# Patient Record
Sex: MALE | Race: WHITE | NOT HISPANIC OR LATINO | Employment: UNEMPLOYED | URBAN - NONMETROPOLITAN AREA
[De-identification: names, ages, dates, MRNs, and addresses within clinical notes are randomized per-mention and may not be internally consistent; named-entity substitution may affect disease eponyms.]

---

## 2024-01-10 ENCOUNTER — HOSPITAL ENCOUNTER (INPATIENT)
Facility: HOSPITAL | Age: 54
LOS: 2 days | Discharge: HOME | DRG: 897 | End: 2024-01-12
Attending: PSYCHIATRY & NEUROLOGY | Admitting: PSYCHIATRY & NEUROLOGY
Payer: COMMERCIAL

## 2024-01-10 ENCOUNTER — HOSPITAL ENCOUNTER (EMERGENCY)
Facility: HOSPITAL | Age: 54
Discharge: PSYCHIATRIC OR SUBSTANCE USE FACILITY | DRG: 897 | End: 2024-01-10
Attending: STUDENT IN AN ORGANIZED HEALTH CARE EDUCATION/TRAINING PROGRAM | Admitting: STUDENT IN AN ORGANIZED HEALTH CARE EDUCATION/TRAINING PROGRAM
Payer: COMMERCIAL

## 2024-01-10 VITALS
HEART RATE: 54 BPM | DIASTOLIC BLOOD PRESSURE: 72 MMHG | BODY MASS INDEX: 25.77 KG/M2 | SYSTOLIC BLOOD PRESSURE: 129 MMHG | TEMPERATURE: 98.2 F | WEIGHT: 180 LBS | RESPIRATION RATE: 17 BRPM | OXYGEN SATURATION: 100 % | HEIGHT: 70 IN

## 2024-01-10 DIAGNOSIS — F15.10 METHAMPHETAMINE ABUSE: Primary | ICD-10-CM

## 2024-01-10 DIAGNOSIS — F11.20 BUPRENORPHINE DEPENDENCE: ICD-10-CM

## 2024-01-10 PROBLEM — F19.20 CHEMICAL DEPENDENCY: Status: ACTIVE | Noted: 2024-01-10

## 2024-01-10 LAB
ALBUMIN SERPL-MCNC: 3.8 G/DL (ref 3.5–5.2)
ALBUMIN/GLOB SERPL: 0.8 G/DL
ALP SERPL-CCNC: 122 U/L (ref 39–117)
ALT SERPL W P-5'-P-CCNC: 117 U/L (ref 1–41)
AMPHET+METHAMPHET UR QL: POSITIVE
AMPHETAMINES UR QL: POSITIVE
ANION GAP SERPL CALCULATED.3IONS-SCNC: 7.7 MMOL/L (ref 5–15)
AST SERPL-CCNC: 87 U/L (ref 1–40)
BARBITURATES UR QL SCN: NEGATIVE
BASOPHILS # BLD AUTO: 0.04 10*3/MM3 (ref 0–0.2)
BASOPHILS NFR BLD AUTO: 0.4 % (ref 0–1.5)
BENZODIAZ UR QL SCN: NEGATIVE
BILIRUB SERPL-MCNC: 0.6 MG/DL (ref 0–1.2)
BILIRUB UR QL STRIP: ABNORMAL
BUN SERPL-MCNC: 17 MG/DL (ref 6–20)
BUN/CREAT SERPL: 19.3 (ref 7–25)
BUPRENORPHINE SERPL-MCNC: POSITIVE NG/ML
CALCIUM SPEC-SCNC: 9.2 MG/DL (ref 8.6–10.5)
CANNABINOIDS SERPL QL: NEGATIVE
CHLORIDE SERPL-SCNC: 99 MMOL/L (ref 98–107)
CLARITY UR: CLEAR
CO2 SERPL-SCNC: 24.3 MMOL/L (ref 22–29)
COCAINE UR QL: NEGATIVE
COLOR UR: ABNORMAL
CREAT SERPL-MCNC: 0.88 MG/DL (ref 0.76–1.27)
DEPRECATED RDW RBC AUTO: 41.6 FL (ref 37–54)
EGFRCR SERPLBLD CKD-EPI 2021: 102.2 ML/MIN/1.73
EOSINOPHIL # BLD AUTO: 0.1 10*3/MM3 (ref 0–0.4)
EOSINOPHIL NFR BLD AUTO: 0.9 % (ref 0.3–6.2)
ERYTHROCYTE [DISTWIDTH] IN BLOOD BY AUTOMATED COUNT: 12.7 % (ref 12.3–15.4)
ETHANOL BLD-MCNC: <10 MG/DL (ref 0–10)
ETHANOL UR QL: <0.01 %
FENTANYL UR-MCNC: POSITIVE NG/ML
GLOBULIN UR ELPH-MCNC: 4.5 GM/DL
GLUCOSE SERPL-MCNC: 139 MG/DL (ref 65–99)
GLUCOSE UR STRIP-MCNC: NEGATIVE MG/DL
HCT VFR BLD AUTO: 43.3 % (ref 37.5–51)
HGB BLD-MCNC: 14.6 G/DL (ref 13–17.7)
HGB UR QL STRIP.AUTO: NEGATIVE
IMM GRANULOCYTES # BLD AUTO: 0.04 10*3/MM3 (ref 0–0.05)
IMM GRANULOCYTES NFR BLD AUTO: 0.4 % (ref 0–0.5)
KETONES UR QL STRIP: ABNORMAL
LEUKOCYTE ESTERASE UR QL STRIP.AUTO: NEGATIVE
LYMPHOCYTES # BLD AUTO: 1.25 10*3/MM3 (ref 0.7–3.1)
LYMPHOCYTES NFR BLD AUTO: 11.2 % (ref 19.6–45.3)
MAGNESIUM SERPL-MCNC: 2.2 MG/DL (ref 1.6–2.6)
MCH RBC QN AUTO: 29.7 PG (ref 26.6–33)
MCHC RBC AUTO-ENTMCNC: 33.7 G/DL (ref 31.5–35.7)
MCV RBC AUTO: 88.2 FL (ref 79–97)
METHADONE UR QL SCN: NEGATIVE
MONOCYTES # BLD AUTO: 0.38 10*3/MM3 (ref 0.1–0.9)
MONOCYTES NFR BLD AUTO: 3.4 % (ref 5–12)
NEUTROPHILS NFR BLD AUTO: 83.7 % (ref 42.7–76)
NEUTROPHILS NFR BLD AUTO: 9.39 10*3/MM3 (ref 1.7–7)
NITRITE UR QL STRIP: NEGATIVE
NRBC BLD AUTO-RTO: 0 /100 WBC (ref 0–0.2)
OPIATES UR QL: NEGATIVE
OXYCODONE UR QL SCN: NEGATIVE
PCP UR QL SCN: NEGATIVE
PH UR STRIP.AUTO: 6 [PH] (ref 5–8)
PLATELET # BLD AUTO: 300 10*3/MM3 (ref 140–450)
PMV BLD AUTO: 10 FL (ref 6–12)
POTASSIUM SERPL-SCNC: 4.1 MMOL/L (ref 3.5–5.2)
PROT SERPL-MCNC: 8.3 G/DL (ref 6–8.5)
PROT UR QL STRIP: NEGATIVE
RBC # BLD AUTO: 4.91 10*6/MM3 (ref 4.14–5.8)
SODIUM SERPL-SCNC: 131 MMOL/L (ref 136–145)
SP GR UR STRIP: >1.03 (ref 1–1.03)
TRICYCLICS UR QL SCN: NEGATIVE
UROBILINOGEN UR QL STRIP: ABNORMAL
WBC NRBC COR # BLD AUTO: 11.2 10*3/MM3 (ref 3.4–10.8)

## 2024-01-10 PROCEDURE — 80053 COMPREHEN METABOLIC PANEL: CPT | Performed by: STUDENT IN AN ORGANIZED HEALTH CARE EDUCATION/TRAINING PROGRAM

## 2024-01-10 PROCEDURE — 83735 ASSAY OF MAGNESIUM: CPT | Performed by: STUDENT IN AN ORGANIZED HEALTH CARE EDUCATION/TRAINING PROGRAM

## 2024-01-10 PROCEDURE — 93010 ELECTROCARDIOGRAM REPORT: CPT | Performed by: INTERNAL MEDICINE

## 2024-01-10 PROCEDURE — 81003 URINALYSIS AUTO W/O SCOPE: CPT | Performed by: STUDENT IN AN ORGANIZED HEALTH CARE EDUCATION/TRAINING PROGRAM

## 2024-01-10 PROCEDURE — 80307 DRUG TEST PRSMV CHEM ANLYZR: CPT | Performed by: STUDENT IN AN ORGANIZED HEALTH CARE EDUCATION/TRAINING PROGRAM

## 2024-01-10 PROCEDURE — 93005 ELECTROCARDIOGRAM TRACING: CPT | Performed by: PSYCHIATRY & NEUROLOGY

## 2024-01-10 PROCEDURE — 99285 EMERGENCY DEPT VISIT HI MDM: CPT

## 2024-01-10 PROCEDURE — 36415 COLL VENOUS BLD VENIPUNCTURE: CPT

## 2024-01-10 PROCEDURE — 85025 COMPLETE CBC W/AUTO DIFF WBC: CPT | Performed by: STUDENT IN AN ORGANIZED HEALTH CARE EDUCATION/TRAINING PROGRAM

## 2024-01-10 PROCEDURE — 82077 ASSAY SPEC XCP UR&BREATH IA: CPT | Performed by: STUDENT IN AN ORGANIZED HEALTH CARE EDUCATION/TRAINING PROGRAM

## 2024-01-10 RX ORDER — ECHINACEA PURPUREA EXTRACT 125 MG
2 TABLET ORAL AS NEEDED
Status: DISCONTINUED | OUTPATIENT
Start: 2024-01-10 | End: 2024-01-12 | Stop reason: HOSPADM

## 2024-01-10 RX ORDER — CLONIDINE HYDROCHLORIDE 0.1 MG/1
0.1 TABLET ORAL ONCE AS NEEDED
Status: DISCONTINUED | OUTPATIENT
Start: 2024-01-14 | End: 2024-01-12 | Stop reason: HOSPADM

## 2024-01-10 RX ORDER — CYCLOBENZAPRINE HCL 10 MG
10 TABLET ORAL 3 TIMES DAILY PRN
Status: DISCONTINUED | OUTPATIENT
Start: 2024-01-10 | End: 2024-01-12 | Stop reason: HOSPADM

## 2024-01-10 RX ORDER — LORAZEPAM 1 MG/1
1 TABLET ORAL
Status: DISCONTINUED | OUTPATIENT
Start: 2024-01-13 | End: 2024-01-12 | Stop reason: HOSPADM

## 2024-01-10 RX ORDER — TRAZODONE HYDROCHLORIDE 50 MG/1
50 TABLET ORAL NIGHTLY PRN
Status: DISCONTINUED | OUTPATIENT
Start: 2024-01-10 | End: 2024-01-12 | Stop reason: HOSPADM

## 2024-01-10 RX ORDER — HYDROXYZINE 50 MG/1
50 TABLET, FILM COATED ORAL EVERY 6 HOURS PRN
Status: DISCONTINUED | OUTPATIENT
Start: 2024-01-10 | End: 2024-01-12 | Stop reason: HOSPADM

## 2024-01-10 RX ORDER — LORAZEPAM 0.5 MG/1
0.5 TABLET ORAL EVERY 4 HOURS PRN
Status: DISCONTINUED | OUTPATIENT
Start: 2024-01-14 | End: 2024-01-12 | Stop reason: HOSPADM

## 2024-01-10 RX ORDER — BENZTROPINE MESYLATE 1 MG/ML
1 INJECTION INTRAMUSCULAR; INTRAVENOUS ONCE AS NEEDED
Status: DISCONTINUED | OUTPATIENT
Start: 2024-01-10 | End: 2024-01-12 | Stop reason: HOSPADM

## 2024-01-10 RX ORDER — LORAZEPAM 2 MG/1
2 TABLET ORAL
Status: COMPLETED | OUTPATIENT
Start: 2024-01-11 | End: 2024-01-11

## 2024-01-10 RX ORDER — LORAZEPAM 2 MG/1
2 TABLET ORAL EVERY 4 HOURS PRN
Status: ACTIVE | OUTPATIENT
Start: 2024-01-11 | End: 2024-01-12

## 2024-01-10 RX ORDER — ALUMINA, MAGNESIA, AND SIMETHICONE 2400; 2400; 240 MG/30ML; MG/30ML; MG/30ML
15 SUSPENSION ORAL EVERY 6 HOURS PRN
Status: DISCONTINUED | OUTPATIENT
Start: 2024-01-10 | End: 2024-01-12 | Stop reason: HOSPADM

## 2024-01-10 RX ORDER — DICYCLOMINE HYDROCHLORIDE 10 MG/1
10 CAPSULE ORAL 3 TIMES DAILY PRN
Status: DISCONTINUED | OUTPATIENT
Start: 2024-01-10 | End: 2024-01-12 | Stop reason: HOSPADM

## 2024-01-10 RX ORDER — CLONIDINE HYDROCHLORIDE 0.1 MG/1
0.1 TABLET ORAL 2 TIMES DAILY PRN
Status: DISCONTINUED | OUTPATIENT
Start: 2024-01-13 | End: 2024-01-12 | Stop reason: HOSPADM

## 2024-01-10 RX ORDER — BENZONATATE 100 MG/1
100 CAPSULE ORAL 3 TIMES DAILY PRN
Status: DISCONTINUED | OUTPATIENT
Start: 2024-01-10 | End: 2024-01-12 | Stop reason: HOSPADM

## 2024-01-10 RX ORDER — BENZTROPINE MESYLATE 1 MG/1
2 TABLET ORAL ONCE AS NEEDED
Status: DISCONTINUED | OUTPATIENT
Start: 2024-01-10 | End: 2024-01-12 | Stop reason: HOSPADM

## 2024-01-10 RX ORDER — CLONIDINE HYDROCHLORIDE 0.1 MG/1
0.1 TABLET ORAL 4 TIMES DAILY PRN
Status: ACTIVE | OUTPATIENT
Start: 2024-01-10 | End: 2024-01-11

## 2024-01-10 RX ORDER — LOPERAMIDE HYDROCHLORIDE 2 MG/1
2 CAPSULE ORAL
Status: DISCONTINUED | OUTPATIENT
Start: 2024-01-10 | End: 2024-01-12 | Stop reason: HOSPADM

## 2024-01-10 RX ORDER — HYDRALAZINE HYDROCHLORIDE 25 MG/1
25 TABLET, FILM COATED ORAL DAILY PRN
Status: DISCONTINUED | OUTPATIENT
Start: 2024-01-10 | End: 2024-01-12 | Stop reason: HOSPADM

## 2024-01-10 RX ORDER — LORAZEPAM 0.5 MG/1
0.5 TABLET ORAL
Status: DISCONTINUED | OUTPATIENT
Start: 2024-01-14 | End: 2024-01-12 | Stop reason: HOSPADM

## 2024-01-10 RX ORDER — ONDANSETRON 4 MG/1
4 TABLET, ORALLY DISINTEGRATING ORAL EVERY 6 HOURS PRN
Status: DISCONTINUED | OUTPATIENT
Start: 2024-01-10 | End: 2024-01-12 | Stop reason: HOSPADM

## 2024-01-10 RX ORDER — CLONIDINE HYDROCHLORIDE 0.1 MG/1
0.1 TABLET ORAL 4 TIMES DAILY PRN
Status: ACTIVE | OUTPATIENT
Start: 2024-01-11 | End: 2024-01-12

## 2024-01-10 RX ORDER — FAMOTIDINE 20 MG/1
20 TABLET, FILM COATED ORAL 2 TIMES DAILY PRN
Status: DISCONTINUED | OUTPATIENT
Start: 2024-01-10 | End: 2024-01-12 | Stop reason: HOSPADM

## 2024-01-10 RX ORDER — IBUPROFEN 400 MG/1
400 TABLET ORAL EVERY 6 HOURS PRN
Status: DISCONTINUED | OUTPATIENT
Start: 2024-01-10 | End: 2024-01-12 | Stop reason: HOSPADM

## 2024-01-10 RX ORDER — LORAZEPAM 1 MG/1
1 TABLET ORAL EVERY 4 HOURS PRN
Status: DISCONTINUED | OUTPATIENT
Start: 2024-01-13 | End: 2024-01-12 | Stop reason: HOSPADM

## 2024-01-10 RX ORDER — CLONIDINE HYDROCHLORIDE 0.1 MG/1
0.1 TABLET ORAL 3 TIMES DAILY PRN
Status: DISCONTINUED | OUTPATIENT
Start: 2024-01-12 | End: 2024-01-12 | Stop reason: HOSPADM

## 2024-01-10 RX ORDER — LORAZEPAM 2 MG/1
2 TABLET ORAL
Status: DISPENSED | OUTPATIENT
Start: 2024-01-10 | End: 2024-01-11

## 2024-01-10 RX ADMIN — HYDROXYZINE HYDROCHLORIDE 50 MG: 50 TABLET ORAL at 21:14

## 2024-01-10 RX ADMIN — IBUPROFEN 400 MG: 400 TABLET, FILM COATED ORAL at 21:15

## 2024-01-10 RX ADMIN — CYCLOBENZAPRINE 10 MG: 10 TABLET, FILM COATED ORAL at 21:14

## 2024-01-10 RX ADMIN — TRAZODONE HYDROCHLORIDE 50 MG: 50 TABLET ORAL at 21:15

## 2024-01-10 RX ADMIN — LORAZEPAM 2 MG: 2 TABLET ORAL at 22:08

## 2024-01-10 RX ADMIN — DICYCLOMINE HYDROCHLORIDE 10 MG: 10 CAPSULE ORAL at 21:15

## 2024-01-10 NOTE — NURSING NOTE
Spoke to  discussed pt assessment, labs, and vitals. New orders to admit pt to detox on routine orders with Ativan protocol and Clonidine protocol.  RBVOx2   ED provider made aware.

## 2024-01-10 NOTE — PLAN OF CARE
"Goal Outcome Evaluation:  Plan of Care Reviewed With: patient  Patient Agreement with Plan of Care: agrees         Outcome Evaluation  Pt presented to detox unit stating, \"I just want to get better.\" PT is a 10 year user of fentanyl 1g-2g IV daily. Pt states he drinks a couple pints of whiskey a day \"or whatever i can get my hands on\" Pt uses meth, unknown amount and frequency. Pt last use of substances was 2 days ago. pt rated anxiety 10, depression 10, and craving 10. pt rated pain 10. Pt denies SI, HI, AVH. Pt states he is irritable. COWS 15. CIWA 15. pt reports fair appetite and poor sleep.                               "

## 2024-01-10 NOTE — NURSING NOTE
"Pt brought in by [facility], pt states he is here to detox off fentanyl and alcohol.   Last use was 2 days ago, 1 gram of fentanyl and couple pints of whiskey.   Pt states he uses fentanyl (IV or snort) everyday around 1-2 grams, and drinks a couple pints of whiskey everyday. States he's been doing this for at least 10 years.   Pt states he occasionally smokes meth, and \"I dont know what else, just anything I can get ahold of.\" Unknown when he last took anything else.     COWS-15  CIWA-14  Pt rates anxiety and depression 10.  Pt adamantly denies SI/HI/AVH.    "

## 2024-01-10 NOTE — ED PROVIDER NOTES
Subjective   History of Present Illness  53-year-old male who presents to the emergency department with complaint of drug abuse.  Patient states has had alcohol, fentanyl, heroin abuse.  Patient denies any suicidal or homicidal ideation.    History provided by:  Patient   used: No    Addiction Problem  Location:  Alcohol, fentanyl, heroin  Severity:  Moderate  Onset quality:  Gradual  Duration:  2 days  Timing:  Intermittent  Progression:  Worsening  Chronicity:  New  Associated symptoms: no chest pain, no congestion, no cough, no diarrhea, no fever, no headaches, no loss of consciousness, no nausea, no rhinorrhea, no shortness of breath and no sore throat        Review of Systems   Constitutional: Negative.  Negative for chills and fever.   HENT:  Negative for congestion, rhinorrhea and sore throat.    Eyes: Negative.  Negative for photophobia, pain and itching.   Respiratory: Negative.  Negative for cough and shortness of breath.    Cardiovascular:  Negative for chest pain.   Gastrointestinal: Negative.  Negative for diarrhea and nausea.   Endocrine: Negative.  Negative for heat intolerance, polydipsia and polyphagia.   Genitourinary:  Negative for genital sores, hematuria and penile pain.   Musculoskeletal: Negative.  Negative for arthralgias, gait problem and joint swelling.   Skin: Negative.    Neurological:  Negative for loss of consciousness and headaches.   Hematological: Negative.    Psychiatric/Behavioral: Negative.  Negative for confusion, dysphoric mood and hallucinations.    All other systems reviewed and are negative.      Past Medical History:   Diagnosis Date    Asthma     Hepatitis C        No Known Allergies    History reviewed. No pertinent surgical history.    History reviewed. No pertinent family history.    Social History     Socioeconomic History    Marital status: Single   Tobacco Use    Smoking status: Former     Types: Cigarettes     Quit date: 2020     Years since  quittin.0   Vaping Use    Vaping Use: Every day    Substances: Nicotine, Flavoring    Devices: Disposable   Substance and Sexual Activity    Alcohol use: Yes     Alcohol/week: 10.0 standard drinks of alcohol     Types: 10 Drinks containing 0.5 oz of alcohol per week     Comment: Drinks 2 pints of whiskey everyday    Drug use: Yes     Types: Fentanyl, Methamphetamines     Comment: 1 gram IV or snort everyday, doesn't know how much meth    Sexual activity: Not Currently           Objective   Physical Exam  Vitals and nursing note reviewed.   Constitutional:       General: He is not in acute distress.     Appearance: Normal appearance. He is normal weight. He is not ill-appearing, toxic-appearing or diaphoretic.   HENT:      Head: Normocephalic and atraumatic.      Right Ear: Tympanic membrane, ear canal and external ear normal. There is no impacted cerumen.      Left Ear: Tympanic membrane, ear canal and external ear normal. There is no impacted cerumen.      Nose: Nose normal. No congestion or rhinorrhea.      Mouth/Throat:      Mouth: Mucous membranes are moist.      Pharynx: Oropharynx is clear. No oropharyngeal exudate or posterior oropharyngeal erythema.   Eyes:      General: No scleral icterus.        Right eye: No discharge.         Left eye: No discharge.      Extraocular Movements: Extraocular movements intact.      Conjunctiva/sclera: Conjunctivae normal.      Pupils: Pupils are equal, round, and reactive to light.   Cardiovascular:      Rate and Rhythm: Normal rate and regular rhythm.      Pulses: Normal pulses.      Heart sounds: Normal heart sounds. No murmur heard.     No friction rub. No gallop.   Pulmonary:      Effort: Pulmonary effort is normal. No respiratory distress.      Breath sounds: Normal breath sounds. No stridor. No wheezing, rhonchi or rales.   Abdominal:      General: Abdomen is flat. Bowel sounds are normal. There is no distension.      Palpations: Abdomen is soft. There is no mass.       Tenderness: There is no abdominal tenderness. There is no right CVA tenderness, left CVA tenderness, guarding or rebound.      Hernia: No hernia is present.   Musculoskeletal:         General: No swelling, tenderness, deformity or signs of injury. Normal range of motion.      Cervical back: Normal range of motion and neck supple. No rigidity or tenderness.      Right lower leg: No edema.   Lymphadenopathy:      Cervical: No cervical adenopathy.   Skin:     General: Skin is warm and dry.      Capillary Refill: Capillary refill takes less than 2 seconds.      Coloration: Skin is not jaundiced or pale.      Findings: No bruising, erythema or lesion.   Neurological:      General: No focal deficit present.      Mental Status: He is alert and oriented to person, place, and time. Mental status is at baseline.      Cranial Nerves: No cranial nerve deficit.      Sensory: No sensory deficit.      Motor: No weakness.      Coordination: Coordination normal.      Gait: Gait normal.   Psychiatric:         Mood and Affect: Mood normal.         Behavior: Behavior normal.         Thought Content: Thought content normal.         Judgment: Judgment normal.         Procedures           ED Course                                             Medical Decision Making  Problems Addressed:  Buprenorphine dependence: complicated acute illness or injury  Methamphetamine abuse: complicated acute illness or injury    Amount and/or Complexity of Data Reviewed  Labs: ordered.        Final diagnoses:   Methamphetamine abuse   Buprenorphine dependence       ED Disposition  ED Disposition       ED Disposition   DC/Transfer to Behavioral Health Condition   Stable    Comment   --               No follow-up provider specified.       Medication List      No changes were made to your prescriptions during this visit.            Leandro Henao PA-C  01/10/24 9968

## 2024-01-11 PROBLEM — F10.20 ALCOHOL USE DISORDER, SEVERE, DEPENDENCE: Status: ACTIVE | Noted: 2024-01-10

## 2024-01-11 PROBLEM — F15.10 METHAMPHETAMINE USE DISORDER, MILD: Status: ACTIVE | Noted: 2024-01-11

## 2024-01-11 PROBLEM — F11.20 OPIOID USE DISORDER, SEVERE, DEPENDENCE: Status: ACTIVE | Noted: 2024-01-11

## 2024-01-11 PROBLEM — F17.200 NICOTINE USE DISORDER: Status: ACTIVE | Noted: 2024-01-11

## 2024-01-11 LAB
HAV IGM SERPL QL IA: ABNORMAL
HBV CORE IGM SERPL QL IA: ABNORMAL
HBV SURFACE AG SERPL QL IA: ABNORMAL
HCV AB SER DONR QL: REACTIVE

## 2024-01-11 PROCEDURE — 80074 ACUTE HEPATITIS PANEL: CPT | Performed by: PSYCHIATRY & NEUROLOGY

## 2024-01-11 PROCEDURE — 99223 1ST HOSP IP/OBS HIGH 75: CPT | Performed by: PSYCHIATRY & NEUROLOGY

## 2024-01-11 RX ADMIN — CYCLOBENZAPRINE 10 MG: 10 TABLET, FILM COATED ORAL at 08:29

## 2024-01-11 RX ADMIN — LORAZEPAM 2 MG: 2 TABLET ORAL at 08:45

## 2024-01-11 RX ADMIN — TRAZODONE HYDROCHLORIDE 50 MG: 50 TABLET ORAL at 21:28

## 2024-01-11 RX ADMIN — LORAZEPAM 2 MG: 2 TABLET ORAL at 14:46

## 2024-01-11 RX ADMIN — HYDROXYZINE HYDROCHLORIDE 50 MG: 50 TABLET ORAL at 21:28

## 2024-01-11 RX ADMIN — LORAZEPAM 2 MG: 2 TABLET ORAL at 21:28

## 2024-01-11 RX ADMIN — DICYCLOMINE HYDROCHLORIDE 10 MG: 10 CAPSULE ORAL at 08:29

## 2024-01-11 RX ADMIN — IBUPROFEN 400 MG: 400 TABLET, FILM COATED ORAL at 08:29

## 2024-01-11 NOTE — H&P
INITIAL PSYCHIATRIC HISTORY & PHYSICAL    Patient Identification:  Name:  Jono Jimenez  Age:  53 y.o.  Sex:  male  :  1970  MRN:  [id number]   Visit Number:  [id number]  Primary Care Physician:  Provider, No Known    SUBJECTIVE    CC/Focus of Exam: Fentanyl and alcohol use    HPI: Jono Jimenez is a 53 y.o. male who was admitted on 1/10/2024 with complaints of Fentanyl and alcohol use use and withdrawals. The patient reports a long history of substance use. First use was 17 years old. Over time the use increased and the patient  continued to use despite negative consequences including relationship problems, social and financial problems. The patient endorses symptoms of tolerance and withdrawals and ongoing cravings to use. Has tried to cut down and stop but has not been successful. Spends too much time and resources in pursuit of substance use. Longest period of sobriety is reported to be 6 months.  Currently using fentanyl (IV or snort) everyday around 1-2 grams, and drinks a couple pints of whiskey everyday.   Last use 3 days ago  Withdrawal symptoms body aches, stomach cramps, hurting all over.     PAST PSYCHIATRIC HX: Patient reports he was treated for depression and anxiety in the past.     SUBSTANCE USE HX: See HPI.  Patient also admits to using meth every once in a while.    SOCIAL HX:   Social History     Socioeconomic History    Marital status: Single   Tobacco Use    Smoking status: Former     Types: Cigarettes     Quit date:      Years since quittin.0   Vaping Use    Vaping Use: Every day    Substances: Nicotine, Flavoring    Devices: Disposable   Substance and Sexual Activity    Alcohol use: Yes     Alcohol/week: 10.0 standard drinks of alcohol     Types: 10 Drinks containing 0.5 oz of alcohol per week     Comment: Drinks 2 pints of whiskey everyday    Drug use: Yes     Types: Fentanyl, Methamphetamines     Comment: 1 gram IV or snort everyday, doesn't know how much meth     Sexual activity: Not Currently         Past Medical History:   Diagnosis Date    Asthma     Hepatitis C         History reviewed. No pertinent surgical history.    History reviewed. No pertinent family history.      No medications prior to admission.         ALLERGIES:  Patient has no known allergies.    Temp:  [97 °F (36.1 °C)-98.2 °F (36.8 °C)] 97.1 °F (36.2 °C)  Heart Rate:  [54-69] 58  Resp:  [16-18] 16  BP: (120-129)/(62-76) 129/70    REVIEW OF SYSTEMS:  Review of Systems   Constitutional:  Positive for chills, diaphoresis and fatigue.   HENT: Negative.     Eyes: Negative.    Respiratory: Negative.     Cardiovascular: Negative.    Gastrointestinal:  Positive for abdominal pain.   Endocrine: Negative.    Genitourinary: Negative.    Musculoskeletal:  Positive for arthralgias, back pain and myalgias.   Skin: Negative.    Allergic/Immunologic: Negative.    Neurological:  Positive for tremors and weakness.   Hematological: Negative.    Psychiatric/Behavioral:  Positive for dysphoric mood. The patient is nervous/anxious.         OBJECTIVE    PHYSICAL EXAM:  Physical Exam  Constitutional:  Appears well-developed and well-nourished.   HENT:   Head: Normocephalic and atraumatic.   Right Ear: External ear normal.   Left Ear: External ear normal.   Mouth/Throat: Oropharynx is clear and moist.   Eyes: Pupils are equal, round, and reactive to light. Conjunctivae and EOM are normal.   Neck: Normal range of motion. Neck supple.   Cardiovascular: Normal rate, regular rhythm and normal heart sounds.    Respiratory: Effort normal and breath sounds normal. No respiratory distress. No wheezes.   GI: Soft. Bowel sounds are normal.No distension. There is no tenderness.   Musculoskeletal: Normal range of motion. No edema or deformity.   Neurological:No cranial nerve deficit. Coordination normal.   Skin: Skin is warm and dry. No rash noted. No erythema.     MENTAL STATUS EXAM:   Hygiene:   fair  Cooperation:  Cooperative  Eye Contact:   Fair  Psychomotor Behavior:  Appropriate  Affect:  Appropriate  Hopelessness: Denies  Speech:  Normal  Thought Process: Goal directed  Thought Content:  Normal  Suicidal:  None  Homicidal:  None  Hallucinations:  None  Delusion:  None  Memory:  Intact  Orientation:  Person, Place, Time and Situation  Reliability:  fair  Insight:  Fair  Judgement:  Fair  Impulse Control:  Fair    Imaging Results (Last 24 Hours)       ** No results found for the last 24 hours. **             ECG/EMG Results (most recent)       Procedure Component Value Units Date/Time    ECG 12 Lead Other; Baseline Cardiac Status [[id number]] Collected: 01/10/24 1757     Updated: 01/10/24 1758     QT Interval 436 ms      QTC Interval 438 ms     Narrative:      Test Reason : Other~  Blood Pressure :   */*   mmHG  Vent. Rate :  61 BPM     Atrial Rate :  61 BPM     P-R Int : 122 ms          QRS Dur : 100 ms      QT Int : 436 ms       P-R-T Axes :  72  76  63 degrees     QTc Int : 438 ms    Normal sinus rhythm  Voltage criteria for left ventricular hypertrophy  Nonspecific ST abnormality  Abnormal ECG  No previous ECGs available    Referred By: REINA           Confirmed By:              Lab Results   Component Value Date    GLUCOSE 139 (H) 01/10/2024    BUN 17 01/10/2024    CREATININE 0.88 01/10/2024    BCR 19.3 01/10/2024    CO2 24.3 01/10/2024    CALCIUM 9.2 01/10/2024    ALBUMIN 3.8 01/10/2024    AST 87 (H) 01/10/2024     (H) 01/10/2024       Lab Results   Component Value Date    WBC 11.20 (H) 01/10/2024    HGB 14.6 01/10/2024    HCT 43.3 01/10/2024    MCV 88.2 01/10/2024     01/10/2024       Last Urine Toxicity          Latest Ref Rng & Units 1/10/2024   LAST URINE TOXICITY RESULTS   Amphetamine, Urine Qual Negative Positive    Barbiturates Screen, Urine Negative Negative    Benzodiazepine Screen, Urine Negative Negative    Buprenorphine, Screen, Urine Negative Positive    Cocaine Screen, Urine Negative Negative    Fentanyl, Urine  Negative Positive    Methadone Screen , Urine Negative Negative    Methamphetamine, Ur Negative Positive        Brief Urine Lab Results  (Last result in the past 365 days)        Color   Clarity   Blood   Leuk Est   Nitrite   Protein   CREAT   Urine HCG        01/10/24 1507 Dark Yellow   Clear   Negative   Negative   Negative   Negative                   DATA  Labs reviewed.  Sodium 131, glucose 139, alkaline phosphatase 122, AST 87, .  WBC 11.20.  Hep C is reactive.  Urinalysis shows dark yellow color, trace ketones, small bilirubin.  Urine drug screen positive for amphetamine, buprenorphine, fentanyl, methamphetamine.  EKG reviewed.  QTc interval 438 ms.  PHILLY reviewed.   Record reviewed. No previous treatment noted in this hospital for mental health or substance use problems.       Strengths: Motivated for treatment    Weaknesses:Substance use and Poor coping skills    Code status:  Full  Discussed code status with patient.    ASSESSMENT & PLAN:    Hospital bed: No      Alcohol use disorder, severe, dependence  -Ativan detox  -Thiamine and folate      Opioid use disorder, severe, dependence  -Clonidine detox      Methamphetamine use disorder, mild  -Supportive treatment      Nicotine use disorder  -Encouraged cessation.      The patient has been admitted for safety and stabilization.  Patient will be monitored for suicidality daily and maintained on Special Precautions Level 4 (q30 min checks).  The patient will have individual and group therapy with a master's level therapist. A master treatment plan will be developed and agreed upon by the patient and his/her treatment team.  The patient's estimated length of stay in the hospital is 5-7 days.

## 2024-01-11 NOTE — PLAN OF CARE
Goal Outcome Evaluation:  Plan of Care Reviewed With: patient  Patient Agreement with Plan of Care: agrees     Progress: no change  Outcome Evaluation: Pt has been calm and cooperative, isolating to room for most of the day. Pt interacts with peers during free time and meals. Pt rates anxiety 5, depression 5, states sleep and appetite are poor. Pt denies SI/HI/AVH. Pt rates cravings 10, c/o body aches. Pt received PRN flexeril, bentyl, and motrin for withdrawal symptoms this shift.

## 2024-01-11 NOTE — PLAN OF CARE
Problem: Adult Behavioral Health Plan of Care  Goal: Plan of Care Review  Outcome: Ongoing, Progressing  Flowsheets (Taken 1/11/2024 1424)  Consent Given to Review Plan with: no consent  Progress: no change  Plan of Care Reviewed With: patient  Patient Agreement with Plan of Care: agrees  Outcome Evaluation: Reviewed plan of care and completed adult social history.  Goal: Patient-Specific Goal (Individualization)  Outcome: Ongoing, Progressing  Flowsheets  Taken 1/11/2024 1440 by Gina Julio LCSW  Patient-Specific Goals (Include Timeframe): Jono to complete medical detox prior to discharge, identify 1-2 healthy coping skills to assist with relapse prevention during patients 3-7 day hospital stay, engage in safe disposition planning prior to discharge  Individualized Care Needs: Medication management, individual and group therapy.  Taken 1/11/2024 1425 by Gina Julio LCSW  Patient Personal Strengths:   expressive of emotions   expressive of needs   motivated for treatment   resourceful  Patient Vulnerabilities: substance abuse/addiction  Taken 1/10/2024 1731 by Phylicia Carroll RN  Anxieties, Fears or Concerns: none voiced  Goal: Optimized Coping Skills in Response to Life Stressors  Outcome: Ongoing, Progressing  Flowsheets (Taken 1/11/2024 1440)  Optimized Coping Skills in Response to Life Stressors: making progress toward outcome  Intervention: Promote Effective Coping Strategies  Flowsheets (Taken 1/11/2024 1440)  Supportive Measures:   active listening utilized   positive reinforcement provided   self-responsibility promoted   counseling provided   problem-solving facilitated   verbalization of feelings encouraged   decision-making supported   goal-setting facilitated   self-care encouraged   self-reflection promoted  Goal: Develops/Participates in Therapeutic Reading to Support Successful Transition  Outcome: Ongoing, Progressing  Flowsheets (Taken 1/11/2024  1440)  Develops/Participates in Therapeutic [place] to Support Successful Transition: making progress toward outcome  Intervention: Foster Therapeutic [place]  Flowsheets (Taken 1/11/2024 1440)  Trust Relationship/Rapport:   care explained   reassurance provided   choices provided   thoughts/feelings acknowledged   emotional support provided   empathic listening provided   questions answered   questions encouraged  Intervention: Mutually Develop Transition Plan  Flowsheets  Taken 1/11/2024 1440  Transition Support:   community resources reviewed   crisis management plan promoted   follow-up care discussed  Taken 1/11/2024 1424  Discharge Coordination/Progress: Patient has no insurance, may require assistance with transportation and consents to attend [facility].  Taken 1/11/2024 1417  Discharge Coordination/Progress: Patient has  Transportation Anticipated: family or friend will provide  Transportation Concerns: rides, unreliable from others  Current Discharge Risk: substance use/abuse  Concerns to be Addressed: substance/tobacco abuse/use  Readmission Within the Last 30 Days: no previous admission in last 30 days  Patient/Family Anticipated Services at Transition:   mental health services   outpatient care  Patient's Choice of Community Agency(s): Patient consents to [facility] [phone]  Patient/Family Anticipates Transition to: inpatient rehabilitation facility  Offered/Gave Vendor List: no   Goal Outcome Evaluation:  Plan of Care Reviewed With: patient  Patient Agreement with Plan of Care: agrees  Consent Given to Review Plan with: no consent  Progress: no change  Outcome Evaluation: Reviewed plan of care and completed adult social history.    DATA:         Therapist met individually with patient this date to introduce role and to discuss hospitalization expectations. Patient agreeable.     Therapist contacted [facility] at phone: [phone] and obtained  fax information to send a referral FAX: [phone].     Clinical Maneuvering/Intervention:     Therapist assisted patient in processing above session content; acknowledged and normalized patient’s thoughts, feelings, and concerns.  Discussed the therapist/patient relationship and explain the parameters and limitations of relative confidentiality.  Also discussed the importance of active participation, and honesty to the treatment process.  Encouraged the patient to discuss/vent their feelings, frustrations, and fears concerning their ongoing medical issues and validated their feelings.     Discussed the importance of finding enjoyable activities and coping skills that the patient can engage in a regular basis. Discussed healthy coping skills such as distraction, self love, grounding, thought challenges/reframing, etc.  Provided patient with list of healthy coping skills this date. Discussed the importance of medication compliance.  Praised the patient for seeking help and spent the majority of the session building rapport.       Allowed patient to freely discuss issues without interruption or judgment. Provided safe, confidential environment to facilitate the development of positive therapeutic relationship and encourage open, honest communication.      Therapist addressed discharge safety planning this date. Assisted patient in identifying risk factors which would indicate the need for higher level of care after discharge;  including thoughts to harm self or others and/or self-harming behavior. Encouraged patient to call 911, or present to the nearest emergency room should any of these events occur. Discussed crisis intervention services and means to access.  Encouraged securing any objects of harm.       Therapist completed integrated summary, treatment plan, and initiated social history this date.  Therapist is strongly encouraging family involvement in treatment.       ASSESSMENT:      Patient is a 53-year-old  "homeless,  male who is a resident of [place]. He presented from [facility] requesting medical detox. He reports that a friend reported that [facility] was a good program and they came to [place] to pick him up for treatment. He reports that he does not feel the program is good for him and reports that he would like to return to [facility] in [facility].  Patient reports he is a 10-year user of fentanyl 1g-2g IV daily. Patient reports he drinks a couple pints of whiskey a day \"or whatever i can get my hands on.\" Patient reports using methamphetamine, unknown amount and frequency. Patient reports his last use of substances was 3 days ago. Patient reports his anxiety is at a 10, depression at a 10, and craving at a 10 on a scale of 1 to 10. Patient denies SI, HI, AVH. Patient reports he is irritable. Patients COWS reported 15 on admission and his CIWA reported at 15. Patient reports having a fair appetite and poor sleep. Patient reports his withdrawal symptoms today include body aches, stomach cramps, and hurting all over. Patient consented for referral to [facility] in [facility] for aftercare.      PLAN:       Patient to remain hospitalized this date.     Treatment team will focus efforts on stabilizing patient's acute symptoms while providing education on healthy coping and crisis management to reduce hospitalizations.   Patient requires daily psychiatrist evaluation and 24/7 nursing supervision to promote patient  safety.     Therapist will offer 1-4 individual sessions, 1 therapy group daily, family education, and appropriate referral.    Patient consents to attend [facility] in [facility] following his stabilization.                           "

## 2024-01-11 NOTE — DISCHARGE PLACEMENT REQUEST
"Jono Jimenez (53 y.o. Male)       Date of Birth   1970    Social Security Number       Address   [place] [address]    Home Phone   [phone]    MRN   [id number]       Worship   None    Marital Status   Single                            Admission Date   1/10/24    Admission Type   Emergency    Admitting Provider   Renaldo Patel MD    Attending Provider   Delano Pedraza MD    Department, Room/Bed   [facility] ADULT CD, 1044/2S       Discharge Date       Discharge Disposition       Discharge Destination                                 Attending Provider: Delano Pedraza MD    Allergies: No Known Allergies    Isolation: None   Infection: None   Code Status: CPR    Ht: 177.8 cm (70\")   Wt: 77 kg (169 lb 12.8 oz)    Admission Cmt: None   Principal Problem: Alcohol use disorder, severe, dependence [F10.20]                   Active Insurance as of 1/10/2024       Patient has no active insurance coverage on file for 1/10/2024.            Emergency Contacts            No emergency contacts on file.                 History & Physical        Delano Pedraza MD at 24 [phone]                INITIAL PSYCHIATRIC HISTORY & PHYSICAL    Patient Identification:  Name:  Jono Jimenez  Age:  53 y.o.  Sex:  male  :  1970  MRN:  [id number]   Visit Number:  [id number]  Primary Care Physician:  Provider, No Known    SUBJECTIVE    CC/Focus of Exam: Fentanyl and alcohol use    HPI: Jono Jimenez is a 53 y.o. male who was admitted on 1/10/2024 with complaints of Fentanyl and alcohol use use and withdrawals. The patient reports a long history of substance use. First use was 17 years old. Over time the use increased and the patient  continued to use despite negative consequences including relationship problems, social and financial problems. The patient endorses symptoms of tolerance and withdrawals and ongoing cravings to use. Has tried to cut down and stop but has not been successful. " Spends too much time and resources in pursuit of substance use. Longest period of sobriety is reported to be 6 months.  Currently using fentanyl (IV or snort) everyday around 1-2 grams, and drinks a couple pints of whiskey everyday.   Last use 3 days ago  Withdrawal symptoms body aches, stomach cramps, hurting all over.     PAST PSYCHIATRIC HX: Patient reports he was treated for depression and anxiety in the past.     SUBSTANCE USE HX: See HPI.  Patient also admits to using meth every once in a while.    SOCIAL HX:   Social History     Socioeconomic History    Marital status: Single   Tobacco Use    Smoking status: Former     Types: Cigarettes     Quit date:      Years since quittin.0   Vaping Use    Vaping Use: Every day    Substances: Nicotine, Flavoring    Devices: Disposable   Substance and Sexual Activity    Alcohol use: Yes     Alcohol/week: 10.0 standard drinks of alcohol     Types: 10 Drinks containing 0.5 oz of alcohol per week     Comment: Drinks 2 pints of whiskey everyday    Drug use: Yes     Types: Fentanyl, Methamphetamines     Comment: 1 gram IV or snort everyday, doesn't know how much meth    Sexual activity: Not Currently         Past Medical History:   Diagnosis Date    Asthma     Hepatitis C         History reviewed. No pertinent surgical history.    History reviewed. No pertinent family history.      No medications prior to admission.         ALLERGIES:  Patient has no known allergies.    Temp:  [97 °F (36.1 °C)-98.2 °F (36.8 °C)] 97.1 °F (36.2 °C)  Heart Rate:  [54-69] 58  Resp:  [16-18] 16  BP: (120-129)/(62-76) 129/70    REVIEW OF SYSTEMS:  Review of Systems   Constitutional:  Positive for chills, diaphoresis and fatigue.   HENT: Negative.     Eyes: Negative.    Respiratory: Negative.     Cardiovascular: Negative.    Gastrointestinal:  Positive for abdominal pain.   Endocrine: Negative.    Genitourinary: Negative.    Musculoskeletal:  Positive for arthralgias, back pain and myalgias.    Skin: Negative.    Allergic/Immunologic: Negative.    Neurological:  Positive for tremors and weakness.   Hematological: Negative.    Psychiatric/Behavioral:  Positive for dysphoric mood. The patient is nervous/anxious.         OBJECTIVE    PHYSICAL EXAM:  Physical Exam  Constitutional:  Appears well-developed and well-nourished.   HENT:   Head: Normocephalic and atraumatic.   Right Ear: External ear normal.   Left Ear: External ear normal.   Mouth/Throat: Oropharynx is clear and moist.   Eyes: Pupils are equal, round, and reactive to light. Conjunctivae and EOM are normal.   Neck: Normal range of motion. Neck supple.   Cardiovascular: Normal rate, regular rhythm and normal heart sounds.    Respiratory: Effort normal and breath sounds normal. No respiratory distress. No wheezes.   GI: Soft. Bowel sounds are normal.No distension. There is no tenderness.   Musculoskeletal: Normal range of motion. No edema or deformity.   Neurological:No cranial nerve deficit. Coordination normal.   Skin: Skin is warm and dry. No rash noted. No erythema.     MENTAL STATUS EXAM:   Hygiene:   fair  Cooperation:  Cooperative  Eye Contact:  Fair  Psychomotor Behavior:  Appropriate  Affect:  Appropriate  Hopelessness: Denies  Speech:  Normal  Thought Process: Goal directed  Thought Content:  Normal  Suicidal:  None  Homicidal:  None  Hallucinations:  None  Delusion:  None  Memory:  Intact  Orientation:  Person, Place, Time and Situation  Reliability:  fair  Insight:  Fair  Judgement:  Fair  Impulse Control:  Fair    Imaging Results (Last 24 Hours)       ** No results found for the last 24 hours. **             ECG/EMG Results (most recent)       Procedure Component Value Units Date/Time    ECG 12 Lead Other; Baseline Cardiac Status [[id number]] Collected: 01/10/24 1757     Updated: 01/10/24 1758     QT Interval 436 ms      QTC Interval 438 ms     Narrative:      Test Reason : Other~  Blood Pressure :   */*   mmHG  Vent. Rate :  61 BPM      Atrial Rate :  61 BPM     P-R Int : 122 ms          QRS Dur : 100 ms      QT Int : 436 ms       P-R-T Axes :  72  76  63 degrees     QTc Int : 438 ms    Normal sinus rhythm  Voltage criteria for left ventricular hypertrophy  Nonspecific ST abnormality  Abnormal ECG  No previous ECGs available    Referred By: REINA           Confirmed By:              Lab Results   Component Value Date    GLUCOSE 139 (H) 01/10/2024    BUN 17 01/10/2024    CREATININE 0.88 01/10/2024    BCR 19.3 01/10/2024    CO2 24.3 01/10/2024    CALCIUM 9.2 01/10/2024    ALBUMIN 3.8 01/10/2024    AST 87 (H) 01/10/2024     (H) 01/10/2024       Lab Results   Component Value Date    WBC 11.20 (H) 01/10/2024    HGB 14.6 01/10/2024    HCT 43.3 01/10/2024    MCV 88.2 01/10/2024     01/10/2024       Last Urine Toxicity          Latest Ref Rng & Units 1/10/2024   LAST URINE TOXICITY RESULTS   Amphetamine, Urine Qual Negative Positive    Barbiturates Screen, Urine Negative Negative    Benzodiazepine Screen, Urine Negative Negative    Buprenorphine, Screen, Urine Negative Positive    Cocaine Screen, Urine Negative Negative    Fentanyl, Urine Negative Positive    Methadone Screen , Urine Negative Negative    Methamphetamine, Ur Negative Positive        Brief Urine Lab Results  (Last result in the past 365 days)        Color   Clarity   Blood   Leuk Est   Nitrite   Protein   CREAT   Urine HCG        01/10/24 1507 Dark Yellow   Clear   Negative   Negative   Negative   Negative                   DATA  Labs reviewed.  Sodium 131, glucose 139, alkaline phosphatase 122, AST 87, .  WBC 11.20.  Hep C is reactive.  Urinalysis shows dark yellow color, trace ketones, small bilirubin.  Urine drug screen positive for amphetamine, buprenorphine, fentanyl, methamphetamine.  EKG reviewed.  QTc interval 438 ms.  PHILLY reviewed.   Record reviewed. No previous treatment noted in this hospital for mental health or substance use problems.       Strengths:  Motivated for treatment    Weaknesses:Substance use and Poor coping skills    Code status:  Full  Discussed code status with patient.    ASSESSMENT & PLAN:    Hospital bed: No      Alcohol use disorder, severe, dependence  -Ativan detox  -Thiamine and folate      Opioid use disorder, severe, dependence  -Clonidine detox      Methamphetamine use disorder, mild  -Supportive treatment      Nicotine use disorder  -Encouraged cessation.      The patient has been admitted for safety and stabilization.  Patient will be monitored for suicidality daily and maintained on Special Precautions Level 4 (q30 min checks).  The patient will have individual and group therapy with a master's level therapist. A master treatment plan will be developed and agreed upon by the patient and his/her treatment team.  The patient's estimated length of stay in the hospital is 5-7 days.             Electronically signed by Delano Pedraza MD at 01/11/24 1835

## 2024-01-11 NOTE — PLAN OF CARE
Goal Outcome Evaluation:  Plan of Care Reviewed With: patient  Patient Agreement with Plan of Care: agrees     Progress: improving     Pt slept well at greater than 8 hours, appetite is poor for shift, and refused group. Pt given Atarax, Motrin, Flexeril, Bentyl, and Ativan 2mg PAS dose.

## 2024-01-12 VITALS
RESPIRATION RATE: 18 BRPM | WEIGHT: 169.8 LBS | BODY MASS INDEX: 24.31 KG/M2 | TEMPERATURE: 97.2 F | HEIGHT: 70 IN | OXYGEN SATURATION: 95 % | HEART RATE: 84 BPM | SYSTOLIC BLOOD PRESSURE: 124 MMHG | DIASTOLIC BLOOD PRESSURE: 75 MMHG

## 2024-01-12 LAB
QT INTERVAL: 436 MS
QTC INTERVAL: 438 MS

## 2024-01-12 PROCEDURE — 99239 HOSP IP/OBS DSCHRG MGMT >30: CPT | Performed by: PSYCHIATRY & NEUROLOGY

## 2024-01-12 PROCEDURE — 63710000001 ONDANSETRON ODT 4 MG TABLET DISPERSIBLE: Performed by: PSYCHIATRY & NEUROLOGY

## 2024-01-12 RX ADMIN — HYDROXYZINE HYDROCHLORIDE 50 MG: 50 TABLET ORAL at 09:20

## 2024-01-12 RX ADMIN — ONDANSETRON 4 MG: 4 TABLET, ORALLY DISINTEGRATING ORAL at 10:16

## 2024-01-12 RX ADMIN — LORAZEPAM 1.5 MG: 1 TABLET ORAL at 09:03

## 2024-01-12 RX ADMIN — DICYCLOMINE HYDROCHLORIDE 10 MG: 10 CAPSULE ORAL at 09:20

## 2024-01-12 RX ADMIN — LOPERAMIDE HYDROCHLORIDE 2 MG: 2 CAPSULE ORAL at 09:20

## 2024-01-12 RX ADMIN — IBUPROFEN 400 MG: 400 TABLET, FILM COATED ORAL at 09:20

## 2024-01-12 RX ADMIN — CYCLOBENZAPRINE 10 MG: 10 TABLET, FILM COATED ORAL at 09:20

## 2024-01-12 NOTE — DISCHARGE PLACEMENT REQUEST
"Jono Jimenez (53 y.o. Male)       Date of Birth   1970    Social Security Number       Address   [address] [address]    Home Phone   [phone]    MRN   [id number]       Catholic   None    Marital Status   Single                            Admission Date   1/10/24    Admission Type   Emergency    Admitting Provider   Renaldo Patel MD    Attending Provider   Delano Pedraza MD    Department, Room/Bed   [facility] ADULT CD, 1044/2S       Discharge Date       Discharge Disposition   Home or Self Care    Discharge Destination                                 Attending Provider: Delano Pedraza MD    Allergies: No Known Allergies    Isolation: None   Infection: None   Code Status: CPR    Ht: 177.8 cm (70\")   Wt: 77 kg (169 lb 12.8 oz)    Admission Cmt: None   Principal Problem: Alcohol use disorder, severe, dependence [F10.20]                   Active Insurance as of 1/10/2024       Patient has no active insurance coverage on file for 1/10/2024.            Emergency Contacts            No emergency contacts on file.                 History & Physical        Delano Pedraza MD at 24 1239                INITIAL PSYCHIATRIC HISTORY & PHYSICAL    Patient Identification:  Name:  Jono Jimenez  Age:  53 y.o.  Sex:  male  :  1970  MRN:  [id number]   Visit Number:  [id number]  Primary Care Physician:  Provider, No Known    SUBJECTIVE    CC/Focus of Exam: Fentanyl and alcohol use    HPI: Jono Jimenez is a 53 y.o. male who was admitted on 1/10/2024 with complaints of Fentanyl and alcohol use use and withdrawals. The patient reports a long history of substance use. First use was 17 years old. Over time the use increased and the patient  continued to use despite negative consequences including relationship problems, social and financial problems. The patient endorses symptoms of tolerance and withdrawals and ongoing cravings to use. Has tried to cut down and stop but has not " been successful. Spends too much time and resources in pursuit of substance use. Longest period of sobriety is reported to be 6 months.  Currently using fentanyl (IV or snort) everyday around 1-2 grams, and drinks a couple pints of whiskey everyday.   Last use 3 days ago  Withdrawal symptoms body aches, stomach cramps, hurting all over.     PAST PSYCHIATRIC HX: Patient reports he was treated for depression and anxiety in the past.     SUBSTANCE USE HX: See HPI.  Patient also admits to using meth every once in a while.    SOCIAL HX:   Social History     Socioeconomic History    Marital status: Single   Tobacco Use    Smoking status: Former     Types: Cigarettes     Quit date:      Years since quittin.0   Vaping Use    Vaping Use: Every day    Substances: Nicotine, Flavoring    Devices: Disposable   Substance and Sexual Activity    Alcohol use: Yes     Alcohol/week: 10.0 standard drinks of alcohol     Types: 10 Drinks containing 0.5 oz of alcohol per week     Comment: Drinks 2 pints of whiskey everyday    Drug use: Yes     Types: Fentanyl, Methamphetamines     Comment: 1 gram IV or snort everyday, doesn't know how much meth    Sexual activity: Not Currently         Past Medical History:   Diagnosis Date    Asthma     Hepatitis C         History reviewed. No pertinent surgical history.    History reviewed. No pertinent family history.      No medications prior to admission.         ALLERGIES:  Patient has no known allergies.    Temp:  [97 °F (36.1 °C)-98.2 °F (36.8 °C)] 97.1 °F (36.2 °C)  Heart Rate:  [54-69] 58  Resp:  [16-18] 16  BP: (120-129)/(62-76) 129/70    REVIEW OF SYSTEMS:  Review of Systems   Constitutional:  Positive for chills, diaphoresis and fatigue.   HENT: Negative.     Eyes: Negative.    Respiratory: Negative.     Cardiovascular: Negative.    Gastrointestinal:  Positive for abdominal pain.   Endocrine: Negative.    Genitourinary: Negative.    Musculoskeletal:  Positive for arthralgias, back  pain and myalgias.   Skin: Negative.    Allergic/Immunologic: Negative.    Neurological:  Positive for tremors and weakness.   Hematological: Negative.    Psychiatric/Behavioral:  Positive for dysphoric mood. The patient is nervous/anxious.         OBJECTIVE    PHYSICAL EXAM:  Physical Exam  Constitutional:  Appears well-developed and well-nourished.   HENT:   Head: Normocephalic and atraumatic.   Right Ear: External ear normal.   Left Ear: External ear normal.   Mouth/Throat: Oropharynx is clear and moist.   Eyes: Pupils are equal, round, and reactive to light. Conjunctivae and EOM are normal.   Neck: Normal range of motion. Neck supple.   Cardiovascular: Normal rate, regular rhythm and normal heart sounds.    Respiratory: Effort normal and breath sounds normal. No respiratory distress. No wheezes.   GI: Soft. Bowel sounds are normal.No distension. There is no tenderness.   Musculoskeletal: Normal range of motion. No edema or deformity.   Neurological:No cranial nerve deficit. Coordination normal.   Skin: Skin is warm and dry. No rash noted. No erythema.     MENTAL STATUS EXAM:   Hygiene:   fair  Cooperation:  Cooperative  Eye Contact:  Fair  Psychomotor Behavior:  Appropriate  Affect:  Appropriate  Hopelessness: Denies  Speech:  Normal  Thought Process: Goal directed  Thought Content:  Normal  Suicidal:  None  Homicidal:  None  Hallucinations:  None  Delusion:  None  Memory:  Intact  Orientation:  Person, Place, Time and Situation  Reliability:  fair  Insight:  Fair  Judgement:  Fair  Impulse Control:  Fair    Imaging Results (Last 24 Hours)       ** No results found for the last 24 hours. **             ECG/EMG Results (most recent)       Procedure Component Value Units Date/Time    ECG 12 Lead Other; Baseline Cardiac Status [[id number]] Collected: 01/10/24 1757     Updated: 01/10/24 1758     QT Interval 436 ms      QTC Interval 438 ms     Narrative:      Test Reason : Other~  Blood Pressure :   */*   mmHG  Vent.  Rate :  61 BPM     Atrial Rate :  61 BPM     P-R Int : 122 ms          QRS Dur : 100 ms      QT Int : 436 ms       P-R-T Axes :  72  76  63 degrees     QTc Int : 438 ms    Normal sinus rhythm  Voltage criteria for left ventricular hypertrophy  Nonspecific ST abnormality  Abnormal ECG  No previous ECGs available    Referred By: REINA           Confirmed By:              Lab Results   Component Value Date    GLUCOSE 139 (H) 01/10/2024    BUN 17 01/10/2024    CREATININE 0.88 01/10/2024    BCR 19.3 01/10/2024    CO2 24.3 01/10/2024    CALCIUM 9.2 01/10/2024    ALBUMIN 3.8 01/10/2024    AST 87 (H) 01/10/2024     (H) 01/10/2024       Lab Results   Component Value Date    WBC 11.20 (H) 01/10/2024    HGB 14.6 01/10/2024    HCT 43.3 01/10/2024    MCV 88.2 01/10/2024     01/10/2024       Last Urine Toxicity          Latest Ref Rng & Units 1/10/2024   LAST URINE TOXICITY RESULTS   Amphetamine, Urine Qual Negative Positive    Barbiturates Screen, Urine Negative Negative    Benzodiazepine Screen, Urine Negative Negative    Buprenorphine, Screen, Urine Negative Positive    Cocaine Screen, Urine Negative Negative    Fentanyl, Urine Negative Positive    Methadone Screen , Urine Negative Negative    Methamphetamine, Ur Negative Positive        Brief Urine Lab Results  (Last result in the past 365 days)        Color   Clarity   Blood   Leuk Est   Nitrite   Protein   CREAT   Urine HCG        01/10/24 1507 Dark Yellow   Clear   Negative   Negative   Negative   Negative                   DATA  Labs reviewed.  Sodium 131, glucose 139, alkaline phosphatase 122, AST 87, .  WBC 11.20.  Hep C is reactive.  Urinalysis shows dark yellow color, trace ketones, small bilirubin.  Urine drug screen positive for amphetamine, buprenorphine, fentanyl, methamphetamine.  EKG reviewed.  QTc interval 438 ms.  PHILLY reviewed.   Record reviewed. No previous treatment noted in this hospital for mental health or substance use problems.        Strengths: Motivated for treatment    Weaknesses:Substance use and Poor coping skills    Code status:  Full  Discussed code status with patient.    ASSESSMENT & PLAN:    Hospital bed: No      Alcohol use disorder, severe, dependence  -Ativan detox  -Thiamine and folate      Opioid use disorder, severe, dependence  -Clonidine detox      Methamphetamine use disorder, mild  -Supportive treatment      Nicotine use disorder  -Encouraged cessation.      The patient has been admitted for safety and stabilization.  Patient will be monitored for suicidality daily and maintained on Special Precautions Level 4 (q30 min checks).  The patient will have individual and group therapy with a master's level therapist. A master treatment plan will be developed and agreed upon by the patient and his/her treatment team.  The patient's estimated length of stay in the hospital is 5-7 days.             Electronically signed by Delano Pedraza MD at 24 1413          Discharge Summary        Delano Pedraza MD at 24 1259          :  1970  MRN:  [id number]  Visit Number:  [id number]      Date of Admission:1/10/2024   Date of Discharge:  2024    Discharge Diagnosis:  Principal Problem:    Alcohol use disorder, severe, dependence  Active Problems:    Opioid use disorder, severe, dependence    Methamphetamine use disorder, mild    Nicotine use disorder        Admission Diagnosis:  Chemical dependency [F19.20]     KM Jimenez is a 53 y.o. male who was admitted on 1/10/2024 with complaints of Fentanyl and alcohol use use and withdrawals.   For details please see H&P dated 24.     Hospital Course  Patient is a 53 y.o. male presented with polysubstance use and withdrawal. He was admitted to the detox recovery unit and started on Ativan and clonidine detox. He was monitored for active withdrawals and he didn't exhibit any but demanded to be started on Subutex. The patient was able to sleep through the night  "and ate 100 percent of all his meals. He reported a lot of subjective discomfort but no objective findings of withdrawals were noted. The patient was informed that he was not meeting criteria for a detox and no further detox is indicated. He was then discharged.      Mental Status Exam upon discharge:   Mood \"irritable\"   Affect-congruent, appropriate, stable  Thought Content-goal directed, no delusional material present  Thought process-linear, organized.  Suicidality: No SI  Homicidality: No HI  Perception: No AH/VH    Procedures Performed         Consults:   Consults       No orders found from 12/12/2023 to 1/11/2024.            Pertinent Test Results:   Admission on 01/10/2024   Component Date Value Ref Range Status    Hepatitis B Surface Ag 01/11/2024 Non-Reactive  Non-Reactive Final    Hep A IgM 01/11/2024 Non-Reactive  Non-Reactive Final    Hep B C IgM 01/11/2024 Non-Reactive  Non-Reactive Final    Hepatitis C Ab 01/11/2024 Reactive (A)  Non-Reactive Final    QT Interval 01/10/2024 436  ms Final    QTC Interval 01/10/2024 438  ms Final   Admission on 01/10/2024, Discharged on 01/10/2024   Component Date Value Ref Range Status    Glucose 01/10/2024 139 (H)  65 - 99 mg/dL Final    BUN 01/10/2024 17  6 - 20 mg/dL Final    Creatinine 01/10/2024 0.88  0.76 - 1.27 mg/dL Final    Sodium 01/10/2024 131 (L)  136 - 145 mmol/L Final    Potassium 01/10/2024 4.1  3.5 - 5.2 mmol/L Final    Slight hemolysis detected by analyzer. Result may be falsely elevated.    Chloride 01/10/2024 99  98 - 107 mmol/L Final    CO2 01/10/2024 24.3  22.0 - 29.0 mmol/L Final    Calcium 01/10/2024 9.2  8.6 - 10.5 mg/dL Final    Total Protein 01/10/2024 8.3  6.0 - 8.5 g/dL Final    Albumin 01/10/2024 3.8  3.5 - 5.2 g/dL Final    ALT (SGPT) 01/10/2024 117 (H)  1 - 41 U/L Final    AST (SGOT) 01/10/2024 87 (H)  1 - 40 U/L Final    Alkaline Phosphatase 01/10/2024 122 (H)  39 - 117 U/L Final    Total Bilirubin 01/10/2024 0.6  0.0 - 1.2 mg/dL Final "    Globulin 01/10/2024 4.5  gm/dL Final    A/G Ratio 01/10/2024 0.8  g/dL Final    BUN/Creatinine Ratio 01/10/2024 19.3  7.0 - 25.0 Final    Anion Gap 01/10/2024 7.7  5.0 - 15.0 mmol/L Final    eGFR 01/10/2024 102.2  >60.0 mL/min/1.73 Final    Color, UA 01/10/2024 Dark Yellow (A)  Yellow, Straw Final    Appearance, UA 01/10/2024 Clear  Clear Final    pH, UA 01/10/2024 6.0  5.0 - 8.0 Final    Specific Gravity, UA 01/10/2024 >1.030 (H)  1.005 - 1.030 Final    Glucose, UA 01/10/2024 Negative  Negative Final    Ketones, UA 01/10/2024 Trace (A)  Negative Final    Bilirubin, UA 01/10/2024 Small (1+) (A)  Negative Final    Blood, UA 01/10/2024 Negative  Negative Final    Protein, UA 01/10/2024 Negative  Negative Final    Leuk Esterase, UA 01/10/2024 Negative  Negative Final    Nitrite, UA 01/10/2024 Negative  Negative Final    Urobilinogen, UA 01/10/2024 1.0 E.U./dL  0.2 - 1.0 E.U./dL Final    THC, Screen, Urine 01/10/2024 Negative  Negative Final    Phencyclidine (PCP), Urine 01/10/2024 Negative  Negative Final    Cocaine Screen, Urine 01/10/2024 Negative  Negative Final    Methamphetamine, Ur 01/10/2024 Positive (A)  Negative Final    Opiate Screen 01/10/2024 Negative  Negative Final    Amphetamine Screen, Urine 01/10/2024 Positive (A)  Negative Final    Benzodiazepine Screen, Urine 01/10/2024 Negative  Negative Final    Tricyclic Antidepressants Screen 01/10/2024 Negative  Negative Final    Methadone Screen, Urine 01/10/2024 Negative  Negative Final    Barbiturates Screen, Urine 01/10/2024 Negative  Negative Final    Oxycodone Screen, Urine 01/10/2024 Negative  Negative Final    Buprenorphine, Screen, Urine 01/10/2024 Positive (A)  Negative Final    Magnesium 01/10/2024 2.2  1.6 - 2.6 mg/dL Final    Ethanol 01/10/2024 <10  0 - 10 mg/dL Final    Ethanol % 01/10/2024 <0.010  % Final    WBC 01/10/2024 11.20 (H)  3.40 - 10.80 10*3/mm3 Final    RBC 01/10/2024 4.91  4.14 - 5.80 10*6/mm3 Final    Hemoglobin 01/10/2024 14.6   13.0 - 17.7 g/dL Final    Hematocrit 01/10/2024 43.3  37.5 - 51.0 % Final    MCV 01/10/2024 88.2  79.0 - 97.0 fL Final    MCH 01/10/2024 29.7  26.6 - 33.0 pg Final    MCHC 01/10/2024 33.7  31.5 - 35.7 g/dL Final    RDW 01/10/2024 12.7  12.3 - 15.4 % Final    RDW-SD 01/10/2024 41.6  37.0 - 54.0 fl Final    MPV 01/10/2024 10.0  6.0 - 12.0 fL Final    Platelets 01/10/2024 300  140 - 450 10*3/mm3 Final    Neutrophil % 01/10/2024 83.7 (H)  42.7 - 76.0 % Final    Lymphocyte % 01/10/2024 11.2 (L)  19.6 - 45.3 % Final    Monocyte % 01/10/2024 3.4 (L)  5.0 - 12.0 % Final    Eosinophil % 01/10/2024 0.9  0.3 - 6.2 % Final    Basophil % 01/10/2024 0.4  0.0 - 1.5 % Final    Immature Grans % 01/10/2024 0.4  0.0 - 0.5 % Final    Neutrophils, Absolute 01/10/2024 9.39 (H)  1.70 - 7.00 10*3/mm3 Final    Lymphocytes, Absolute 01/10/2024 1.25  0.70 - 3.10 10*3/mm3 Final    Monocytes, Absolute 01/10/2024 0.38  0.10 - 0.90 10*3/mm3 Final    Eosinophils, Absolute 01/10/2024 0.10  0.00 - 0.40 10*3/mm3 Final    Basophils, Absolute 01/10/2024 0.04  0.00 - 0.20 10*3/mm3 Final    Immature Grans, Absolute 01/10/2024 0.04  0.00 - 0.05 10*3/mm3 Final    nRBC 01/10/2024 0.0  0.0 - 0.2 /100 WBC Final    Fentanyl, Urine 01/10/2024 Positive (A)  Negative Final        Condition on Discharge:  improved    Vital Signs  Temp:  [97.1 °F (36.2 °C)-98.2 °F (36.8 °C)] 97.4 °F (36.3 °C)  Heart Rate:  [58-86] 66  Resp:  [16-18] 18  BP: (123-134)/(68-86) 134/84      Discharge Disposition:  Home or Self Care    Discharge Medications:     Discharge Medications      Patient Not Prescribed Medications Upon Discharge         Discharge Diet: Regular     Activity at Discharge: As tolerated     Follow-up Appointments  Patient reported he would go to [place].        Time: I spent  > 30  minutes on this discharge activity which included: face-to-face encounter with the patient, reviewing the data in the system, coordination of the care with the nursing staff as  well as consultants, documentation, and entering orders.        Clinician:   Delano Pedraza MD  01/12/24  12:59 EST    Electronically signed by Delano Pedraza MD at 01/12/24 9227

## 2024-01-12 NOTE — PROGRESS NOTES
Navigator is helping with the following referral:    [facility] - [phone]  -Sent 1/11  -Transferred call so patient could complete phone screening.  1/12  -Spoke with Lavern. She completed phone screening with patient. She will ask supervisor about providing transportation next week and call back.  1/12  -Patient will not meet criteria for detox. Treatment team to send over discharge summary so they can apply for a farnaz bed for patient. Sent at this time. 1/12  -Called to check on referral. Still pending. Intake states they will have  call to talk about possible transportation if patient is accepted today.   -Received call from . She states they can not provide transportation for such a long trip today. Drivers will not be available until Monday, weather permitting.  Treatment team to call back Monday morning to further coordinate if patient is still admitted.  1/12

## 2024-01-12 NOTE — PLAN OF CARE
Goal Outcome Evaluation:  Plan of Care Reviewed With: patient  Patient Agreement with Plan of Care: agrees     Progress: improving    Pt slept well and over 8 hours. In room and withdrawn all night. Pt given Trazodone and Atarax prn medications.

## 2024-01-12 NOTE — PLAN OF CARE
Problem: Adult Behavioral Health Plan of Care  Goal: Develops/Participates in [facility] to Support Successful Transition  Intervention: Mutually Develop Transition Plan  Recent Flowsheet Documentation  Taken 1/12/2024 1510 by Gina Julio LCSW  Transportation Anticipated: agency  Transportation Concerns: rides, unreliable from others  Current Discharge Risk: substance use/abuse  Concerns to be Addressed: substance/tobacco abuse/use  Readmission Within the Last 30 Days: no previous admission in last 30 days  Patient/Family Anticipated Services at Transition: rehabilitation services  Patient/Family Anticipates Transition to: inpatient rehabilitation facility  Offered/Gave Vendor List: no      Therapist informed of patient discharge.  Patient reports he is ready to attend [facility].  Discussed with patient that they are working to review patients referral and may or may not approve patient today.  Discussed with patient that if they are unable to take him that he would have to return to [facility].  Patient reports he will not return to [facility] but that they should drive him back to [facility] because they came and picked him up.  Patient will also be provided phone contact information for [facility] to discuss transport home today if [facility] cannot accept him today.    Patient cannot be accepted by [facility] today or their sister facilities.  Patient has been informed he is discharged and can return with [facility] staff to attempt to secure another facility if he is unhappy with [facility].  [facility] report they cannot transport him back to [place] today.  [facility] staff report transport will pick patient up today.

## 2024-01-12 NOTE — PLAN OF CARE
Goal Outcome Evaluation:  Plan of Care Reviewed With: patient  Patient Agreement with Plan of Care: agrees                PT DISCHARGING TODAY

## 2024-09-18 ENCOUNTER — LAB (OUTPATIENT)
Dept: LAB | Facility: HOSPITAL | Age: 54
End: 2024-09-18
Payer: COMMERCIAL

## 2024-09-18 ENCOUNTER — OFFICE VISIT (OUTPATIENT)
Dept: FAMILY MEDICINE CLINIC | Facility: CLINIC | Age: 54
End: 2024-09-18
Payer: COMMERCIAL

## 2024-09-18 VITALS
HEART RATE: 81 BPM | HEIGHT: 68 IN | WEIGHT: 241.2 LBS | OXYGEN SATURATION: 95 % | RESPIRATION RATE: 20 BRPM | SYSTOLIC BLOOD PRESSURE: 114 MMHG | DIASTOLIC BLOOD PRESSURE: 76 MMHG | TEMPERATURE: 98 F | BODY MASS INDEX: 36.56 KG/M2

## 2024-09-18 DIAGNOSIS — F19.10 SUBSTANCE ABUSE: ICD-10-CM

## 2024-09-18 DIAGNOSIS — R06.02 SHORTNESS OF BREATH: ICD-10-CM

## 2024-09-18 DIAGNOSIS — R60.0 BILATERAL LOWER EXTREMITY EDEMA: ICD-10-CM

## 2024-09-18 DIAGNOSIS — E55.9 VITAMIN D DEFICIENCY: ICD-10-CM

## 2024-09-18 DIAGNOSIS — R53.83 FATIGUE, UNSPECIFIED TYPE: ICD-10-CM

## 2024-09-18 DIAGNOSIS — G47.9 SLEEP DISORDER: ICD-10-CM

## 2024-09-18 DIAGNOSIS — Z00.00 ENCOUNTER FOR MEDICAL EXAMINATION TO ESTABLISH CARE: Primary | ICD-10-CM

## 2024-09-18 LAB
AMPHET+METHAMPHET UR QL: NEGATIVE
AMPHETAMINES UR QL: NEGATIVE
BARBITURATES UR QL SCN: NEGATIVE
BASOPHILS # BLD AUTO: 0.08 10*3/MM3 (ref 0–0.2)
BASOPHILS NFR BLD AUTO: 0.9 % (ref 0–1.5)
BENZODIAZ UR QL SCN: NEGATIVE
BUPRENORPHINE SERPL-MCNC: POSITIVE NG/ML
CANNABINOIDS SERPL QL: NEGATIVE
COCAINE UR QL: NEGATIVE
DEPRECATED RDW RBC AUTO: 39.3 FL (ref 37–54)
EOSINOPHIL # BLD AUTO: 0.52 10*3/MM3 (ref 0–0.4)
EOSINOPHIL NFR BLD AUTO: 6.1 % (ref 0.3–6.2)
ERYTHROCYTE [DISTWIDTH] IN BLOOD BY AUTOMATED COUNT: 12.4 % (ref 12.3–15.4)
ERYTHROCYTE [SEDIMENTATION RATE] IN BLOOD: 29 MM/HR (ref 0–20)
FENTANYL UR-MCNC: NEGATIVE NG/ML
HBA1C MFR BLD: 5.5 % (ref 4.8–5.6)
HCT VFR BLD AUTO: 48.1 % (ref 37.5–51)
HGB BLD-MCNC: 16.1 G/DL (ref 13–17.7)
IMM GRANULOCYTES # BLD AUTO: 0.02 10*3/MM3 (ref 0–0.05)
IMM GRANULOCYTES NFR BLD AUTO: 0.2 % (ref 0–0.5)
LYMPHOCYTES # BLD AUTO: 1.68 10*3/MM3 (ref 0.7–3.1)
LYMPHOCYTES NFR BLD AUTO: 19.7 % (ref 19.6–45.3)
MCH RBC QN AUTO: 29.5 PG (ref 26.6–33)
MCHC RBC AUTO-ENTMCNC: 33.5 G/DL (ref 31.5–35.7)
MCV RBC AUTO: 88.1 FL (ref 79–97)
METHADONE UR QL SCN: NEGATIVE
MONOCYTES # BLD AUTO: 0.52 10*3/MM3 (ref 0.1–0.9)
MONOCYTES NFR BLD AUTO: 6.1 % (ref 5–12)
NEUTROPHILS NFR BLD AUTO: 5.71 10*3/MM3 (ref 1.7–7)
NEUTROPHILS NFR BLD AUTO: 67 % (ref 42.7–76)
NRBC BLD AUTO-RTO: 0 /100 WBC (ref 0–0.2)
NT-PROBNP SERPL-MCNC: 249.6 PG/ML (ref 0–900)
OPIATES UR QL: NEGATIVE
OXYCODONE UR QL SCN: NEGATIVE
PCP UR QL SCN: NEGATIVE
PLATELET # BLD AUTO: 213 10*3/MM3 (ref 140–450)
PMV BLD AUTO: 11.6 FL (ref 6–12)
RBC # BLD AUTO: 5.46 10*6/MM3 (ref 4.14–5.8)
TRICYCLICS UR QL SCN: NEGATIVE
WBC NRBC COR # BLD AUTO: 8.53 10*3/MM3 (ref 3.4–10.8)

## 2024-09-18 PROCEDURE — 82607 VITAMIN B-12: CPT | Performed by: PHYSICIAN ASSISTANT

## 2024-09-18 PROCEDURE — 83880 ASSAY OF NATRIURETIC PEPTIDE: CPT | Performed by: PHYSICIAN ASSISTANT

## 2024-09-18 PROCEDURE — 80053 COMPREHEN METABOLIC PANEL: CPT | Performed by: PHYSICIAN ASSISTANT

## 2024-09-18 PROCEDURE — 84425 ASSAY OF VITAMIN B-1: CPT | Performed by: PHYSICIAN ASSISTANT

## 2024-09-18 PROCEDURE — 82306 VITAMIN D 25 HYDROXY: CPT | Performed by: PHYSICIAN ASSISTANT

## 2024-09-18 PROCEDURE — 83036 HEMOGLOBIN GLYCOSYLATED A1C: CPT | Performed by: PHYSICIAN ASSISTANT

## 2024-09-18 PROCEDURE — 84402 ASSAY OF FREE TESTOSTERONE: CPT | Performed by: PHYSICIAN ASSISTANT

## 2024-09-18 PROCEDURE — 82746 ASSAY OF FOLIC ACID SERUM: CPT | Performed by: PHYSICIAN ASSISTANT

## 2024-09-18 PROCEDURE — 85025 COMPLETE CBC W/AUTO DIFF WBC: CPT | Performed by: PHYSICIAN ASSISTANT

## 2024-09-18 PROCEDURE — 80061 LIPID PANEL: CPT | Performed by: PHYSICIAN ASSISTANT

## 2024-09-18 PROCEDURE — 36415 COLL VENOUS BLD VENIPUNCTURE: CPT | Performed by: PHYSICIAN ASSISTANT

## 2024-09-18 PROCEDURE — 84403 ASSAY OF TOTAL TESTOSTERONE: CPT | Performed by: PHYSICIAN ASSISTANT

## 2024-09-18 PROCEDURE — 80307 DRUG TEST PRSMV CHEM ANLYZR: CPT | Performed by: PHYSICIAN ASSISTANT

## 2024-09-18 PROCEDURE — 85652 RBC SED RATE AUTOMATED: CPT | Performed by: PHYSICIAN ASSISTANT

## 2024-09-18 RX ORDER — FLUTICASONE PROPIONATE AND SALMETEROL 100; 50 UG/1; UG/1
POWDER RESPIRATORY (INHALATION)
COMMUNITY

## 2024-09-18 RX ORDER — ALBUTEROL SULFATE 90 UG/1
2 AEROSOL, METERED RESPIRATORY (INHALATION) EVERY 4 HOURS PRN
COMMUNITY

## 2024-09-18 RX ORDER — BUPRENORPHINE HYDROCHLORIDE AND NALOXONE HYDROCHLORIDE DIHYDRATE 8; 2 MG/1; MG/1
1 TABLET SUBLINGUAL 2 TIMES DAILY
COMMUNITY

## 2024-09-19 LAB
25(OH)D3 SERPL-MCNC: 27 NG/ML (ref 30–100)
ALBUMIN SERPL-MCNC: 4.2 G/DL (ref 3.5–5.2)
ALBUMIN/GLOB SERPL: 1.2 G/DL
ALP SERPL-CCNC: 83 U/L (ref 39–117)
ALT SERPL W P-5'-P-CCNC: 28 U/L (ref 1–41)
ANION GAP SERPL CALCULATED.3IONS-SCNC: 8.8 MMOL/L (ref 5–15)
AST SERPL-CCNC: 28 U/L (ref 1–40)
BILIRUB SERPL-MCNC: 0.3 MG/DL (ref 0–1.2)
BUN SERPL-MCNC: 12 MG/DL (ref 6–20)
BUN/CREAT SERPL: 10.7 (ref 7–25)
CALCIUM SPEC-SCNC: 9.5 MG/DL (ref 8.6–10.5)
CHLORIDE SERPL-SCNC: 103 MMOL/L (ref 98–107)
CHOLEST SERPL-MCNC: 200 MG/DL (ref 0–200)
CO2 SERPL-SCNC: 24.2 MMOL/L (ref 22–29)
CREAT SERPL-MCNC: 1.12 MG/DL (ref 0.76–1.27)
EGFRCR SERPLBLD CKD-EPI 2021: 78.1 ML/MIN/1.73
FOLATE SERPL-MCNC: 10.2 NG/ML (ref 4.78–24.2)
GLOBULIN UR ELPH-MCNC: 3.6 GM/DL
GLUCOSE SERPL-MCNC: 152 MG/DL (ref 65–99)
HDLC SERPL-MCNC: 38 MG/DL (ref 40–60)
LDLC SERPL CALC-MCNC: 150 MG/DL (ref 0–100)
LDLC/HDLC SERPL: 3.92 {RATIO}
POTASSIUM SERPL-SCNC: 4 MMOL/L (ref 3.5–5.2)
PROT SERPL-MCNC: 7.8 G/DL (ref 6–8.5)
SODIUM SERPL-SCNC: 136 MMOL/L (ref 136–145)
TRIGL SERPL-MCNC: 66 MG/DL (ref 0–150)
VIT B12 BLD-MCNC: 402 PG/ML (ref 211–946)
VLDLC SERPL-MCNC: 12 MG/DL (ref 5–40)

## 2024-09-23 LAB
TESTOST FREE SERPL-MCNC: 1.4 PG/ML (ref 7.2–24)
TESTOST SERPL-MCNC: 383 NG/DL (ref 264–916)
VIT B1 BLD-SCNC: 142.7 NMOL/L (ref 66.5–200)

## 2024-10-03 ENCOUNTER — OFFICE VISIT (OUTPATIENT)
Dept: FAMILY MEDICINE CLINIC | Facility: CLINIC | Age: 54
End: 2024-10-03
Payer: COMMERCIAL

## 2024-10-03 VITALS
TEMPERATURE: 98.2 F | RESPIRATION RATE: 20 BRPM | WEIGHT: 240.8 LBS | SYSTOLIC BLOOD PRESSURE: 110 MMHG | DIASTOLIC BLOOD PRESSURE: 76 MMHG | HEIGHT: 68 IN | OXYGEN SATURATION: 95 % | HEART RATE: 76 BPM | BODY MASS INDEX: 36.49 KG/M2

## 2024-10-03 DIAGNOSIS — Z00.00 ENCOUNTER FOR ANNUAL HEALTH EXAMINATION: Primary | ICD-10-CM

## 2024-10-03 DIAGNOSIS — J45.20 MILD INTERMITTENT ASTHMA WITHOUT COMPLICATION: ICD-10-CM

## 2024-10-03 DIAGNOSIS — K21.9 GASTROESOPHAGEAL REFLUX DISEASE, UNSPECIFIED WHETHER ESOPHAGITIS PRESENT: ICD-10-CM

## 2024-10-03 DIAGNOSIS — E55.9 VITAMIN D DEFICIENCY: ICD-10-CM

## 2024-10-03 DIAGNOSIS — R79.89 DECREASED TESTOSTERONE LEVEL: ICD-10-CM

## 2024-10-03 DIAGNOSIS — Z12.11 SCREENING FOR COLON CANCER: ICD-10-CM

## 2024-10-03 DIAGNOSIS — J30.2 SEASONAL ALLERGIES: ICD-10-CM

## 2024-10-03 DIAGNOSIS — R49.0 HOARSENESS: ICD-10-CM

## 2024-10-03 DIAGNOSIS — Z12.2 SCREENING FOR LUNG CANCER: ICD-10-CM

## 2024-10-03 PROBLEM — F10.91 ALCOHOL USE DISORDER IN REMISSION: Status: ACTIVE | Noted: 2024-10-03

## 2024-10-03 PROBLEM — F10.20 ALCOHOL USE DISORDER, SEVERE, DEPENDENCE: Status: RESOLVED | Noted: 2024-01-10 | Resolved: 2024-10-03

## 2024-10-03 PROCEDURE — 1160F RVW MEDS BY RX/DR IN RCRD: CPT | Performed by: PHYSICIAN ASSISTANT

## 2024-10-03 PROCEDURE — 1126F AMNT PAIN NOTED NONE PRSNT: CPT | Performed by: PHYSICIAN ASSISTANT

## 2024-10-03 PROCEDURE — 99396 PREV VISIT EST AGE 40-64: CPT | Performed by: PHYSICIAN ASSISTANT

## 2024-10-03 PROCEDURE — 1159F MED LIST DOCD IN RCRD: CPT | Performed by: PHYSICIAN ASSISTANT

## 2024-10-03 RX ORDER — CETIRIZINE HYDROCHLORIDE 10 MG/1
10 TABLET ORAL DAILY
Qty: 90 TABLET | Refills: 1 | Status: SHIPPED | OUTPATIENT
Start: 2024-10-03

## 2024-10-03 RX ORDER — ERGOCALCIFEROL 1.25 MG/1
50000 CAPSULE, LIQUID FILLED ORAL WEEKLY
Qty: 12 CAPSULE | Refills: 1 | Status: SHIPPED | OUTPATIENT
Start: 2024-10-03

## 2024-10-03 RX ORDER — PANTOPRAZOLE SODIUM 40 MG/1
40 TABLET, DELAYED RELEASE ORAL DAILY
Qty: 90 TABLET | Refills: 1 | Status: SHIPPED | OUTPATIENT
Start: 2024-10-03

## 2024-10-03 NOTE — ASSESSMENT & PLAN NOTE
The patient is here for health maintenance visit.  Currently, the patient consumes a unhealthy diet and has an inadequate exercise regimen.  Screening lab work from 9/18/2024 reviewed.  Immunizations were reviewed today.  Advice and education was given regarding nutrition, aerobic exercise, routine dental evaluations, routine eye exams, reproductive health, cardiovascular risk reduction, sunscreen use, self skin examination (annual dermatology evaluations) and seatbelt use (general overall safety).  Further recommendations will be given if needed after lab evaluation.  Annual wellness evaluation is recommended.

## 2024-10-03 NOTE — ASSESSMENT & PLAN NOTE
Well-controlled at this time per patient  Denies any persistent shortness of breath or frequent use of albuterol inhaler

## 2024-10-03 NOTE — ASSESSMENT & PLAN NOTE
Discussed this can contribute to decreased testosterone levels  Will begin weekly supplement of 50,000 units  Could be contributing to fatigue.

## 2024-10-03 NOTE — ASSESSMENT & PLAN NOTE
Feel this is uncontrolled and could be contributing to hoarseness  Will begin Protonix 40 mg daily

## 2024-10-03 NOTE — ASSESSMENT & PLAN NOTE
Will refer to urology for further evaluation/management   Suspect this could be contributing factor to patient's fatigue

## 2024-10-03 NOTE — PROGRESS NOTES
"     Male Physical Note      Patient Name: Jono Jimenez  : 1970   MRN: [id number]     Chief Complaint:    Chief Complaint   Patient presents with    Annual Exam       History of Present Illness: Jono Jimenez is a 53 y.o. male who is here today for their annual health maintenance and physical.    Chronic medical conditions include history of substance abuse, nicotine dependence and asthma.    At previous visit on 2024 patient was experiencing shortness of breath and bilateral lower extremity edema.  Since then, patient states both have resolved and he has no ongoing difficulties.  He attributes this to some \"therapeutic music\" that he has been listening to.  Reports decreased use of his albuterol inhaler.  States he has not yet refilled Advair, states it awaits a prior authorization.    He continues to report daily fatigue.    Patient continues to report some hoarseness.  He states this started after being diagnosed with strep approximately 3 months ago.  He does report he has some significant GERD, which he typically treats with baking soda and water.  He also states he has a history of pollen allergies, though does not take any current daily allergy medications.    Patient continues to refrain from illicit drug or substance use.  He continues daily Suboxone use.  Denies any recent alcohol use.  Urine drug screen on 2024 consistent with this.     He has no additional complaints or concerns today and is otherwise doing well.      Subjective      Past Medical History, Social History, Family History and Care Team were all reviewed with patient and updated as appropriate.     Medications:     Current Outpatient Medications:     albuterol sulfate  (90 Base) MCG/ACT inhaler, Inhale 2 puffs Every 4 (Four) Hours As Needed for Wheezing., Disp: , Rfl:     buprenorphine-naloxone (SUBOXONE) 8-2 MG per SL tablet, Place 1 tablet under the tongue 2 (Two) Times a Day., Disp: , Rfl:     cetirizine " (zyrTEC) 10 MG tablet, Take 1 tablet by mouth Daily., Disp: 90 tablet, Rfl: 1    Fluticasone-Salmeterol (ADVAIR/WIXELA) 100-50 MCG/ACT DISKUS, Inhale 2 (Two) Times a Day. (Patient not taking: Reported on 10/3/2024), Disp: , Rfl:     pantoprazole (PROTONIX) 40 MG EC tablet, Take 1 tablet by mouth Daily., Disp: 90 tablet, Rfl: 1    vitamin D (ERGOCALCIFEROL) 1.25 MG (27023 UT) capsule capsule, Take 1 capsule by mouth 1 (One) Time Per Week., Disp: 12 capsule, Rfl: 1    Allergies:   Allergies   Allergen Reactions    Naproxen Unknown - Low Severity    Penicillins Unknown - Low Severity       Health Maintenance   Topic Date Due    COLORECTAL CANCER SCREENING  Never done    LUNG CANCER SCREENING  Never done    ANNUAL PHYSICAL  Never done    ZOSTER VACCINE (1 of 2) 10/03/2024 (Originally 12/21/2019)    COVID-19 Vaccine (1 - 2023-24 season) 12/08/2024 (Originally 9/1/2024)    INFLUENZA VACCINE  03/31/2025 (Originally 8/1/2024)    TDAP/TD VACCINES (1 - Tdap) 10/03/2025 (Originally 12/21/1988)    BMI FOLLOWUP  10/03/2025    HEPATITIS C SCREENING  Completed    Pneumococcal Vaccine 0-64  Aged Out       Class 2 Severe Obesity (BMI >=35 and <=39.9). Obesity-related health conditions include the following: dyslipidemias and GERD. Obesity is unchanged. BMI is is above average; BMI management plan is completed. We discussed portion control and increasing exercise.        Depression: PHQ-2 Depression Screening  Little interest or pleasure in doing things? 0-->not at all   Feeling down, depressed, or hopeless?     PHQ-2 Total Score 0      Intimate partner violence: (Screen on initial visit, older adult with injury or evidence of neglect):  Violence can be a problem in many people's lives, so I now ask every patient about trauma or abuse they may have experienced in a relationship.  Stress/Safety - Do you feel safe in your relationship?  Afraid/Abused - Have you ever been in a relationship where you were threatened, hurt, or  "afraid?  Friend/Family - Are your friends aware you have been hurt?  Emergency Plan - Do you have a safe place to go and the resources you need in an emergency?    Osteoporosis:   Men: history of low trauma fracture, androgen deprivation therapy for prostate cancer, hypogonadism, primary hyperparathyroidism, intestinal disorders.     Objective     Physical Exam:  Vital Signs:   Vitals:    10/03/24 1431   BP: 110/76   Pulse: 76   Resp: 20   Temp: 98.2 °F (36.8 °C)   TempSrc: Temporal   SpO2: 95%   Weight: 109 kg (240 lb 12.8 oz)   Height: 172.7 cm (67.99\")   PainSc: 0-No pain     Body mass index is 36.62 kg/m².     Physical Exam  Constitutional:       General: He is not in acute distress.     Appearance: He is not ill-appearing.   HENT:      Head: Normocephalic.      Right Ear: Tympanic membrane and ear canal normal.      Left Ear: Tympanic membrane and ear canal normal.      Nose: Nose normal.      Mouth/Throat:      Mouth: Mucous membranes are moist.      Pharynx: No oropharyngeal exudate or posterior oropharyngeal erythema.   Eyes:      Pupils: Pupils are equal, round, and reactive to light.   Cardiovascular:      Rate and Rhythm: Normal rate and regular rhythm.      Heart sounds: No murmur heard.  Pulmonary:      Effort: Pulmonary effort is normal. No respiratory distress.      Breath sounds: No wheezing, rhonchi or rales.   Abdominal:      General: Abdomen is flat. There is no distension.      Tenderness: There is no abdominal tenderness.   Musculoskeletal:         General: Normal range of motion.   Skin:     General: Skin is warm.   Neurological:      General: No focal deficit present.      Mental Status: He is alert.   Psychiatric:         Mood and Affect: Mood normal.         Procedures    Assessment / Plan      Assessment/Plan:      Assessment and Plan     Diagnoses and all orders for this visit:    1. Encounter for annual health examination (Primary)  Assessment & Plan:  The patient is here for health " maintenance visit.  Currently, the patient consumes a unhealthy diet and has an inadequate exercise regimen.  Screening lab work from 9/18/2024 reviewed.  Immunizations were reviewed today.  Advice and education was given regarding nutrition, aerobic exercise, routine dental evaluations, routine eye exams, reproductive health, cardiovascular risk reduction, sunscreen use, self skin examination (annual dermatology evaluations) and seatbelt use (general overall safety).  Further recommendations will be given if needed after lab evaluation.  Annual wellness evaluation is recommended.        2. Vitamin D deficiency  Assessment & Plan:  Discussed this can contribute to decreased testosterone levels  Will begin weekly supplement of 50,000 units  Could be contributing to fatigue.      Orders:  -     vitamin D (ERGOCALCIFEROL) 1.25 MG (48179 UT) capsule capsule; Take 1 capsule by mouth 1 (One) Time Per Week.  Dispense: 12 capsule; Refill: 1    3. Decreased testosterone level  Assessment & Plan:  Will refer to urology for further evaluation/management   Suspect this could be contributing factor to patient's fatigue     Orders:  -     Ambulatory Referral to Urology    4. Screening for colon cancer  -     Cologuard - Stool, Per Rectum; Future    5. Gastroesophageal reflux disease, unspecified whether esophagitis present  Assessment & Plan:  Feel this is uncontrolled and could be contributing to hoarseness  Will begin Protonix 40 mg daily    Orders:  -     pantoprazole (PROTONIX) 40 MG EC tablet; Take 1 tablet by mouth Daily.  Dispense: 90 tablet; Refill: 1    6. Hoarseness  Assessment & Plan:  Differential discussed at length  Discussed the need to control GERD and allergies  Will follow-up in 8 weeks to see if symptoms are improving      7. Seasonal allergies  Assessment & Plan:  Suspect this could be contributing to hoarseness  Will begin Zyrtec      Orders:  -     cetirizine (zyrTEC) 10 MG tablet; Take 1 tablet by mouth  Daily.  Dispense: 90 tablet; Refill: 1    8. Mild intermittent asthma without complication  Assessment & Plan:  Well-controlled at this time per patient  Denies any persistent shortness of breath or frequent use of albuterol inhaler             9. Screening for lung cancer  -      CT Chest Low Dose Cancer Screening WO; Future         Follow Up:   Return in about 8 weeks (around 11/28/2024) for hoarseness, GERD ,allergies .    Healthcare Maintenance:   Counseling provided on   Jono Jimenez voices understanding and acceptance of this advice and will call back with any further questions or concerns. AVS with preventive healthcare tips printed for patient.     Dunia Hirsch PA-C    [facility]

## 2024-10-03 NOTE — ASSESSMENT & PLAN NOTE
Differential discussed at length  Discussed the need to control GERD and allergies  Will follow-up in 8 weeks to see if symptoms are improving

## 2024-11-25 ENCOUNTER — TELEPHONE (OUTPATIENT)
Dept: FAMILY MEDICINE CLINIC | Facility: CLINIC | Age: 54
End: 2024-11-25
Payer: COMMERCIAL

## 2024-11-25 NOTE — TELEPHONE ENCOUNTER
Caller: Jono Jimenez    Relationship: Self    Best call back number: [phone]     What orders are you requesting (i.e. lab or imaging): COLOGUARD STOOL PREP TEST     In what timeframe would the patient need to come in: ASAP    Where will you receive your lab/imaging services:     Additional notes: PATIENT IS REQUESTING FOR THE KIT TO BE MAILED TO HIS HOME . THE ORIGINAL KIT WAS NEVER MAILED OUT TO THE PATIENT .    PLEASE CALL PATIENT TO LET HIM KNOW THAT THE KIT HAS BEEN MAILED OUT

## 2024-11-25 NOTE — TELEPHONE ENCOUNTER
Patient given number to [facility] [phone] and advised to call them to have them send kit.

## 2024-11-26 NOTE — TELEPHONE ENCOUNTER
Provider: DR. OLSEN    Caller: Jono Jimenez    Relationship to Patient: Self    Phone Number: [phone]     Reason for Call: THE PATIENT IS CALLING TO LET THE PROVIDER KNOW THAT HE JUST SPOKE WITH [facility] AND THEY STATED THAT THEY DO NOT HAVE THE ORDERS FOR THE COLOGUARD KIT TO MAIL TO THE PATIENT.     PATIENT IS ASKING TO HAVE THIS SENT IN ASAP .

## 2024-11-27 DIAGNOSIS — Z12.11 SCREENING FOR COLON CANCER: Primary | ICD-10-CM

## 2024-12-04 ENCOUNTER — TELEPHONE (OUTPATIENT)
Dept: FAMILY MEDICINE CLINIC | Facility: CLINIC | Age: 54
End: 2024-12-04

## 2024-12-26 DIAGNOSIS — E55.9 VITAMIN D DEFICIENCY: ICD-10-CM

## 2024-12-26 RX ORDER — ERGOCALCIFEROL 1.25 MG/1
50000 CAPSULE, LIQUID FILLED ORAL WEEKLY
Qty: 12 CAPSULE | Refills: 1 | Status: SHIPPED | OUTPATIENT
Start: 2024-12-26

## 2025-01-17 DIAGNOSIS — J45.20 MILD INTERMITTENT ASTHMA WITHOUT COMPLICATION: Primary | ICD-10-CM

## 2025-01-17 RX ORDER — ALBUTEROL SULFATE 90 UG/1
2 INHALANT RESPIRATORY (INHALATION) EVERY 4 HOURS PRN
Qty: 18 G | Refills: 5 | Status: SHIPPED | OUTPATIENT
Start: 2025-01-17

## 2025-01-17 NOTE — TELEPHONE ENCOUNTER
Caller: Jono Jimenez    Relationship: Self    Best call back number: [phone]     Requested Prescriptions:   Requested Prescriptions     Pending Prescriptions Disp Refills    albuterol sulfate  (90 Base) MCG/ACT inhaler       Sig: Inhale 2 puffs Every 4 (Four) Hours As Needed for Wheezing.        Pharmacy where request should be sent: [facility] [id number] [place]  AT [facility] [address] - [phone]  - [phone] FX     Last office visit with prescribing clinician: 10/3/2024   Last telemedicine visit with prescribing clinician: Visit date not found   Next office visit with prescribing clinician: Visit date not found     Additional details provided by patient: PATIENT IS OUT

## 2025-01-18 DIAGNOSIS — K21.9 GASTROESOPHAGEAL REFLUX DISEASE, UNSPECIFIED WHETHER ESOPHAGITIS PRESENT: ICD-10-CM

## 2025-01-18 RX ORDER — PANTOPRAZOLE SODIUM 40 MG/1
40 TABLET, DELAYED RELEASE ORAL DAILY
Qty: 90 TABLET | Refills: 1 | Status: SHIPPED | OUTPATIENT
Start: 2025-01-18

## 2025-01-21 ENCOUNTER — TELEPHONE (OUTPATIENT)
Dept: FAMILY MEDICINE CLINIC | Facility: CLINIC | Age: 55
End: 2025-01-21
Payer: COMMERCIAL

## 2025-01-21 NOTE — TELEPHONE ENCOUNTER
Tried to call patient to remind of pending CT Chest that SHAWNA Lombardo ordered on 10/03/24. Number was not in service, so unable to reach to remind.

## 2025-03-10 ENCOUNTER — TELEPHONE (OUTPATIENT)
Dept: FAMILY MEDICINE CLINIC | Facility: CLINIC | Age: 55
End: 2025-03-10

## 2025-07-22 ENCOUNTER — TELEPHONE (OUTPATIENT)
Dept: FAMILY MEDICINE CLINIC | Facility: CLINIC | Age: 55
End: 2025-07-22

## 2025-08-07 ENCOUNTER — HOSPITAL ENCOUNTER (EMERGENCY)
Facility: HOSPITAL | Age: 55
Discharge: HOME | End: 2025-08-07
Attending: EMERGENCY MEDICINE
Payer: COMMERCIAL

## 2025-08-07 VITALS
HEIGHT: 67 IN | TEMPERATURE: 97.4 F | SYSTOLIC BLOOD PRESSURE: 112 MMHG | RESPIRATION RATE: 18 BRPM | OXYGEN SATURATION: 94 % | DIASTOLIC BLOOD PRESSURE: 92 MMHG | BODY MASS INDEX: 37.67 KG/M2 | HEART RATE: 62 BPM | WEIGHT: 240 LBS

## 2025-08-07 DIAGNOSIS — Z02.89 REFERRED BY HEALTH CARE PROFESSIONAL: ICD-10-CM

## 2025-08-07 DIAGNOSIS — F19.91 HISTORY OF DRUG USE: ICD-10-CM

## 2025-08-07 DIAGNOSIS — Z59.00 HOMELESS: ICD-10-CM

## 2025-08-07 DIAGNOSIS — F11.90 METHADONE USE: Primary | ICD-10-CM

## 2025-08-07 LAB
ALBUMIN SERPL-MCNC: 4.3 G/DL (ref 3.5–5.2)
ALBUMIN/GLOB SERPL: 1.2 G/DL
ALP SERPL-CCNC: 94 U/L (ref 39–117)
ALT SERPL W P-5'-P-CCNC: 21 U/L (ref 1–41)
AMPHET+METHAMPHET UR QL: NEGATIVE
AMPHETAMINES UR QL: NEGATIVE
ANION GAP SERPL CALCULATED.3IONS-SCNC: 9 MMOL/L (ref 5–15)
AST SERPL-CCNC: 21 U/L (ref 1–40)
BARBITURATES UR QL SCN: NEGATIVE
BASOPHILS # BLD AUTO: 0.09 10*3/MM3 (ref 0–0.2)
BASOPHILS NFR BLD AUTO: 0.9 % (ref 0–1.5)
BENZODIAZ UR QL SCN: NEGATIVE
BILIRUB SERPL-MCNC: 0.5 MG/DL (ref 0–1.2)
BILIRUB UR QL STRIP: NEGATIVE
BUN SERPL-MCNC: 15.6 MG/DL (ref 6–20)
BUN/CREAT SERPL: 14.7 (ref 7–25)
BUPRENORPHINE SERPL-MCNC: NEGATIVE NG/ML
CALCIUM SPEC-SCNC: 9.3 MG/DL (ref 8.6–10.5)
CANNABINOIDS SERPL QL: NEGATIVE
CHLORIDE SERPL-SCNC: 102 MMOL/L (ref 98–107)
CLARITY UR: CLEAR
CO2 SERPL-SCNC: 26 MMOL/L (ref 22–29)
COCAINE UR QL: NEGATIVE
COLOR UR: YELLOW
CREAT SERPL-MCNC: 1.06 MG/DL (ref 0.76–1.27)
DEPRECATED RDW RBC AUTO: 40.1 FL (ref 37–54)
EGFRCR SERPLBLD CKD-EPI 2021: 82.9 ML/MIN/1.73
EOSINOPHIL # BLD AUTO: 0.26 10*3/MM3 (ref 0–0.4)
EOSINOPHIL NFR BLD AUTO: 2.7 % (ref 0.3–6.2)
ERYTHROCYTE [DISTWIDTH] IN BLOOD BY AUTOMATED COUNT: 12.4 % (ref 12.3–15.4)
ETHANOL BLD-MCNC: <10 MG/DL (ref 0–10)
FENTANYL UR-MCNC: NEGATIVE NG/ML
GLOBULIN UR ELPH-MCNC: 3.5 GM/DL
GLUCOSE SERPL-MCNC: 78 MG/DL (ref 65–99)
GLUCOSE UR STRIP-MCNC: NEGATIVE MG/DL
HCT VFR BLD AUTO: 46.5 % (ref 37.5–51)
HGB BLD-MCNC: 15.6 G/DL (ref 13–17.7)
HGB UR QL STRIP.AUTO: NEGATIVE
IMM GRANULOCYTES # BLD AUTO: 0.03 10*3/MM3 (ref 0–0.05)
IMM GRANULOCYTES NFR BLD AUTO: 0.3 % (ref 0–0.5)
KETONES UR QL STRIP: NEGATIVE
LEUKOCYTE ESTERASE UR QL STRIP.AUTO: NEGATIVE
LYMPHOCYTES # BLD AUTO: 1.89 10*3/MM3 (ref 0.7–3.1)
LYMPHOCYTES NFR BLD AUTO: 19.3 % (ref 19.6–45.3)
MCH RBC QN AUTO: 29.6 PG (ref 26.6–33)
MCHC RBC AUTO-ENTMCNC: 33.5 G/DL (ref 31.5–35.7)
MCV RBC AUTO: 88.2 FL (ref 79–97)
METHADONE UR QL SCN: POSITIVE
MONOCYTES # BLD AUTO: 0.5 10*3/MM3 (ref 0.1–0.9)
MONOCYTES NFR BLD AUTO: 5.1 % (ref 5–12)
NEUTROPHILS NFR BLD AUTO: 7.04 10*3/MM3 (ref 1.7–7)
NEUTROPHILS NFR BLD AUTO: 71.7 % (ref 42.7–76)
NITRITE UR QL STRIP: NEGATIVE
NRBC BLD AUTO-RTO: 0 /100 WBC (ref 0–0.2)
OPIATES UR QL: NEGATIVE
OXYCODONE UR QL SCN: NEGATIVE
PCP UR QL SCN: NEGATIVE
PH UR STRIP.AUTO: <=5 [PH] (ref 5–8)
PLATELET # BLD AUTO: 272 10*3/MM3 (ref 140–450)
PMV BLD AUTO: 10.3 FL (ref 6–12)
POTASSIUM SERPL-SCNC: 4.8 MMOL/L (ref 3.5–5.2)
PROT SERPL-MCNC: 7.8 G/DL (ref 6–8.5)
PROT UR QL STRIP: NEGATIVE
RBC # BLD AUTO: 5.27 10*6/MM3 (ref 4.14–5.8)
SODIUM SERPL-SCNC: 137 MMOL/L (ref 136–145)
SP GR UR STRIP: 1.02 (ref 1–1.03)
TRICYCLICS UR QL SCN: NEGATIVE
UROBILINOGEN UR QL STRIP: NORMAL
WBC NRBC COR # BLD AUTO: 9.81 10*3/MM3 (ref 3.4–10.8)

## 2025-08-07 PROCEDURE — 36415 COLL VENOUS BLD VENIPUNCTURE: CPT

## 2025-08-07 PROCEDURE — 99283 EMERGENCY DEPT VISIT LOW MDM: CPT

## 2025-08-07 PROCEDURE — 82077 ASSAY SPEC XCP UR&BREATH IA: CPT | Performed by: NURSE PRACTITIONER

## 2025-08-07 PROCEDURE — 80307 DRUG TEST PRSMV CHEM ANLYZR: CPT | Performed by: NURSE PRACTITIONER

## 2025-08-07 PROCEDURE — 81003 URINALYSIS AUTO W/O SCOPE: CPT | Performed by: NURSE PRACTITIONER

## 2025-08-07 PROCEDURE — 80053 COMPREHEN METABOLIC PANEL: CPT | Performed by: NURSE PRACTITIONER

## 2025-08-07 PROCEDURE — 85025 COMPLETE CBC W/AUTO DIFF WBC: CPT | Performed by: NURSE PRACTITIONER

## 2025-08-07 PROCEDURE — 93005 ELECTROCARDIOGRAM TRACING: CPT | Performed by: NURSE PRACTITIONER

## 2025-08-07 RX ORDER — SODIUM CHLORIDE 0.9 % (FLUSH) 0.9 %
10 SYRINGE (ML) INJECTION AS NEEDED
Status: DISCONTINUED | OUTPATIENT
Start: 2025-08-07 | End: 2025-08-07 | Stop reason: HOSPADM

## 2025-08-07 SDOH — ECONOMIC STABILITY - HOUSING INSECURITY: HOMELESSNESS UNSPECIFIED: Z59.00

## 2025-08-08 LAB
QT INTERVAL: 438 MS
QTC INTERVAL: 426 MS

## 2025-08-26 LAB
QT INTERVAL: 438 MS
QTC INTERVAL: 426 MS